# Patient Record
Sex: MALE | Race: WHITE | HISPANIC OR LATINO | Employment: FULL TIME | URBAN - METROPOLITAN AREA
[De-identification: names, ages, dates, MRNs, and addresses within clinical notes are randomized per-mention and may not be internally consistent; named-entity substitution may affect disease eponyms.]

---

## 2017-04-24 ENCOUNTER — OCCUPATIONAL MEDICINE (OUTPATIENT)
Dept: URGENT CARE | Facility: CLINIC | Age: 32
End: 2017-04-24
Payer: COMMERCIAL

## 2017-04-24 VITALS
SYSTOLIC BLOOD PRESSURE: 140 MMHG | HEART RATE: 98 BPM | RESPIRATION RATE: 18 BRPM | DIASTOLIC BLOOD PRESSURE: 100 MMHG | WEIGHT: 266 LBS | TEMPERATURE: 97.8 F | BODY MASS INDEX: 33.07 KG/M2 | HEIGHT: 75 IN | OXYGEN SATURATION: 97 %

## 2017-04-24 DIAGNOSIS — S39.012A STRAIN OF LUMBAR REGION, INITIAL ENCOUNTER: ICD-10-CM

## 2017-04-24 PROCEDURE — 99203 OFFICE O/P NEW LOW 30 MIN: CPT | Mod: 29 | Performed by: NURSE PRACTITIONER

## 2017-04-24 RX ORDER — OMEPRAZOLE 20 MG/1
20 CAPSULE, DELAYED RELEASE ORAL DAILY
COMMUNITY
End: 2021-08-23

## 2017-04-24 RX ORDER — IBUPROFEN 200 MG
200 TABLET ORAL EVERY 6 HOURS PRN
COMMUNITY
End: 2022-12-28

## 2017-04-24 ASSESSMENT — ENCOUNTER SYMPTOMS
TREMORS: 0
NEUROLOGICAL NEGATIVE: 1
NECK PAIN: 0
BACK PAIN: 1
GASTROINTESTINAL NEGATIVE: 1
FALLS: 0
CONSTITUTIONAL NEGATIVE: 1
EYES NEGATIVE: 1
PSYCHIATRIC NEGATIVE: 1
FOCAL WEAKNESS: 0
CARDIOVASCULAR NEGATIVE: 1
RESPIRATORY NEGATIVE: 1
MYALGIAS: 0
SENSORY CHANGE: 0
TINGLING: 0

## 2017-04-24 NOTE — MR AVS SNAPSHOT
"        Adolfo Rodas De La Rosa   2017 11:15 AM   Occupational Medicine   MRN: [id number]    Department:  [facility]   Dept Phone:  [phone]    Description:  Male : 1985   Provider:  GATITO Vela           Reason for Visit     Work-Related Injury WC Back Injury       Allergies as of 2017     No Known Allergies      You were diagnosed with     Strain of lumbar region, initial encounter   [[id number]]         Vital Signs     Blood Pressure Pulse Temperature Respirations Height Weight    140/100 mmHg 98 36.6 °C (97.8 °F) 18 1.905 m (6' 3\") 120.657 kg (266 lb)    Body Mass Index Oxygen Saturation Smoking Status             33.25 kg/m2 97% Never Smoker          Basic Information     Date Of Birth Sex Race Ethnicity Preferred Language    1985 Male White Non- English      Health Maintenance     Patient has no pending health maintenance at this time      Current Immunizations     No immunizations on file.      Below and/or attached are the medications your provider expects you to take. Review all of your home medications and newly ordered medications with your provider and/or pharmacist. Follow medication instructions as directed by your provider and/or pharmacist. Please keep your medication list with you and share with your provider. Update the information when medications are discontinued, doses are changed, or new medications (including over-the-counter products) are added; and carry medication information at all times in the event of emergency situations     Allergies:  No Known Allergies          Medications  Valid as of: 2017 - 12:06 PM    Generic Name Brand Name Tablet Size Instructions for use    Ibuprofen (Tab) MOTRIN 200 MG Take 200 mg by mouth every 6 hours as needed.        Omeprazole (CAPSULE DELAYED RELEASE) PRILOSEC 20 MG Take 20 mg by mouth every day.        .                 Medicines prescribed today were sent to:     [facility] [place] [place] [address]    [address] [place] [place] [address]    Phone: [phone] Fax: [phone]    Open 24 Hours?: No      Medication refill instructions:       If your prescription bottle indicates you have medication refills left, it is not necessary to call your provider’s office. Please contact your pharmacy and they will refill your medication.    If your prescription bottle indicates you do not have any refills left, you may request refills at any time through one of the following ways: The online [employer] system (except Urgent Care), by calling your provider’s office, or by asking your pharmacy to contact your provider’s office with a refill request. Medication refills are processed only during regular business hours and may not be available until the next business day. Your provider may request additional information or to have a follow-up visit with you prior to refilling your medication.   *Please Note: Medication refills are assigned a new Rx number when refilled electronically. Your pharmacy may indicate that no refills were authorized even though a new prescription for the same medication is available at the pharmacy. Please request the medicine by name with the pharmacy before contacting your provider for a refill.           [employer] Access Code: [email or web address]  Expires: 5/24/2017 12:06 PM    [employer]  A secure, online tool to manage your health information     [employer]’s [employer]® is a secure, online tool that connects you to your personalized health information from the privacy of your home -- day or night - making it very easy for you to manage your healthcare. Once the activation process is completed, you can even access your medical information using the [employer] gonzalo, which is available for free in the Apple Gonzalo store or Google Play store.     [employer] provides the following levels of access (as shown below):   My Chart Features   [facility] Primary [facility] Doctor [facility]  Specialists [facility]  Urgent  Care  Non-[facility]  Primary Care  Doctor   Email your healthcare team securely and privately 24/7 X X X    Manage appointments: schedule your next appointment; view details of past/upcoming appointments X      Request prescription refills. X      View recent personal medical records, including lab and immunizations X X X X   View health record, including health history, allergies, medications X X X X   Read reports about your outpatient visits, procedures, consult and ER notes X X X X   See your discharge summary, which is a recap of your hospital and/or ER visit that includes your diagnosis, lab results, and care plan. X X       How to register for [employer]:  1. Go to  [email or web address].  2. Click on the Sign Up Now box, which takes you to the New Member Sign Up page. You will need to provide the following information:  a. Enter your [employer] Access Code exactly as it appears at the top of this page. (You will not need to use this code after you’ve completed the sign-up process. If you do not sign up before the expiration date, you must request a new code.)   b. Enter your date of birth.   c. Enter your home email address.   d. Click Submit, and follow the next screen’s instructions.  3. Create a [employer] ID. This will be your [employer] login ID and cannot be changed, so think of one that is secure and easy to remember.  4. Create a [employer] password. You can change your password at any time.  5. Enter your Password Reset Question and Answer. This can be used at a later time if you forget your password.   6. Enter your e-mail address. This allows you to receive e-mail notifications when new information is available in Lang [employer].  7. Click Sign Up. You can now view your health information.    For assistance activating your [employer] account, call [phone]

## 2017-04-24 NOTE — Clinical Note
"EMPLOYEE’S CLAIM FOR COMPENSATION/ REPORT OF INITIAL TREATMENT  FORM C-4    EMPLOYEE’S CLAIM - PROVIDE ALL INFORMATION REQUESTED   First Name  Adolfo Last Name  Rj Birthdate                    1985                Sex  male Claim Number   Home Address  [id number] Age  31 y.o. Height  1.905 m (6' 3\") Weight  120.657 kg (266 lb) [facility]     [place] Zip  [address] Telephone  [phone] (home)    Mailing Address  [id number] [place] Zip  [address] Primary Language Spoken  English    Insurer   Third Party   Esis   Employee's Occupation (Job Title) When Injury or Occupational Disease Occurred      Employer's Name    [employer] Telephone   [phone]   [address]   [address] [place]   [address]   Date of Injury  4/23/2017               Hour of Injury  5:30 PM Date Employer Notified  4/24/2017 Last Day of Work after Injury or Occupational Disease  4/21/2017 Supervisor to Whom Injury Reported  candido moore   Address or Location of Accident (if applicable)  [home]   What were you doing at the time of accident? (if applicable)  moving pos sign bin    How did this injury or occupational disease occur? (Be specific an answer in detail. Use additional sheet if necessary)  moving pos sign bin/ organizing material in vehicle   If you believe that you have an occupational disease, when did you first have knowledge of the disability and it relationship to your employment?  n/a Witnesses to the Accident  n/a      Nature of Injury or Occupational Disease  Workers' Compensation  Part(s) of Body Injured or Affected  Lower Back Area (Lumbar Area & Lumbo-Sacral), N/A, N/A    I certify that the above is true and correct to the best of my knowledge and that I have provided this information in order to obtain " the benefits of [place]).  I hereby authorize any physician, chiropractor, surgeon, practitioner, or other person, any hospital, including [facility] or [facility] hospital, any medical service organization, any insurance company, or other institution or organization to release to each other, any medical or other information, including benefits paid or payable, pertinent to this injury or disease, except information relative to diagnosis, treatment and/or counseling for AIDS, psychological conditions, alcohol or controlled substances, for which I must give specific authorization.  A Photostat of this authorization shall be as valid as the original.     Date 4/24/2017   [facility] Urgent care   Employee’s Signature   THIS REPORT MUST BE COMPLETED AND MAILED WITHIN 3 WORKING DAYS OF TREATMENT   [facility] URGENT [facility]ARE  Name of Facility  [facility]   Date  4/24/2017 Diagnosis  (S39.012A) Strain of lumbar region, initial encounter Is there evidence the injured employee was under the influence of alcohol and/or another controlled substance at the time of accident?   Hour  11:21 AM Description of Injury or Disease  The encounter diagnosis was Strain of lumbar region, initial encounter. No   Treatment  OTC naproxen, rest, heat or icy therapy, work restrictions, return to clinic 4/27/17 for re-eval  Have you advised the patient to remain off work five days or more? No   X-Ray Findings    Comments:N/A   If Yes   From Date  To Date      From information given by the employee, together with medical evidence, can you directly connect this injury or occupational disease as job incurred?  Yes  Comments:Difficult to fully correlate at this time If No Full Duty  No Modified Duty  Yes   Is additional medical care by a physician indicated?  Yes If Modified Duty, Specify any Limitations /  "Restrictions  Per D39   Do you know of any previous injury or disease contributing to this condition or occupational disease?                            No   Date  4/24/2017 Print Doctor’s Name GATITO Vela I certify the employer’s copy of  this form was mailed on:   Address  [address] Insurer[address]s Use Only     [place] Zip  [address]    Provider’s Tax ID Number  [id number]  Telephone  Dept: [phone]        e-MATILDA Diop   e-Signature: Dr. Shun Melgar, Medical Director Degree  APRN        ORIGINAL-TREATING PHYSICIAN OR CHIROPRACTOR    PAGE 2-INSURER/TPA    PAGE 3-EMPLOYER    PAGE 4-EMPLOYEE             Form C-4 (rev10/07)              BRIEF DESCRIPTION OF RIGHTS AND BENEFITS  (Pursuant to [place] 616C.050)    Notice of Injury or Occupational Disease (Incident Report Form C-1): If an injury or occupational disease (OD) arises out of and in the  course of employment, you must provide written notice to your employer as soon as practicable, but no later than 7 days after the accident or  OD. Your employer shall maintain a sufficient supply of the required forms.    Claim for Compensation (Form C-4): If medical treatment is sought, the form C-4 is available at the place of initial treatment. A completed  \"Claim for Compensation\" (Form C-4) must be filed within 90 days after an accident or OD. The treating physician or chiropractor must,  within 3 working days after treatment, complete and mail to the employer, the employer's insurer and third-party , the Claim for  Compensation.    Medical Treatment: If you require medical treatment for your on-the-job injury or OD, you may be required to select a physician or  chiropractor from a list provided by your workers’ compensation insurer, if it has contracted with an Organization for Managed Care (MCO) or  Preferred Provider Organization (PPO) or providers of health care. If your employer has not entered into a " contract with an MCO or PPO, you  may select a physician or chiropractor from the Panel of Physicians and Chiropractors. Any medical costs related to your industrial injury or  OD will be paid by your insurer.    Temporary Total Disability (TTD): If your doctor has certified that you are unable to work for a period of at least 5 consecutive days, or 5  cumulative days in a 20-day period, or places restrictions on you that your employer does not accommodate, you may be entitled to TTD  compensation.    Temporary Partial Disability (TPD): If the wage you receive upon reemployment is less than the compensation for TTD to which you are  entitled, the insurer may be required to pay you TPD compensation to make up the difference. TPD can only be paid for a maximum of 24  months.    Permanent Partial Disability (PPD): When your medical condition is stable and there is an indication of a PPD as a result of your injury or  OD, within 30 days, your insurer must arrange for an evaluation by a rating physician or chiropractor to determine the degree of your PPD. The  amount of your PPD award depends on the date of injury, the results of the PPD evaluation and your age and wage.    Permanent Total Disability (PTD): If you are medically certified by a treating physician or chiropractor as permanently and totally disabled  and have been granted a PTD status by your insurer, you are entitled to receive monthly benefits not to exceed 66 2/3% of your average  monthly wage. The amount of your PTD payments is subject to reduction if you previously received a PPD award.    Vocational Rehabilitation Services: You may be eligible for vocational rehabilitation services if you are unable to return to the job due to a  permanent physical impairment or permanent restrictions as a result of your injury or occupational disease.    Transportation and Per Sean Reimbursement: You may be eligible for travel expenses and per sean associated with  medical treatment.    Reopening: You may be able to reopen your claim if your condition worsens after claim closure.    Appeal Process: If you disagree with a written determination issued by the insurer or the insurer does not respond to your request, you may  appeal to the Department of Administration, , by following the instructions contained in your determination letter. You must  appeal the determination within 70 days from the date of the determination letter at [address], [address], [place], [place]  [address], or [address], [place], [place] [address]. If you disagree with the  decision, you may appeal to the  Department of Administration, . You must file your appeal within 30 days from the date of the  decision  letter at [address], [address], [place], [place] [address], or [address], [place], [place] [address]. If you  disagree with a decision of an , you may file a petition for judicial review with the District Court. You must do so within 30  days of the Appeal Officer’s decision. You may be represented by an  at your own expense or you may contact the [facility] for possible  representation.    [place]  for Injured Workers (NAIW): If you disagree with a  decision, you may request that NAIW represent you  without charge at an  Hearing. For information regarding denial of benefits, you may contact the [facility] at: [address], [place] [address], [phone], or [address], [address], [place] [address], [phone]    To File a Complaint with the Division: If you wish to file a complaint with the  of the Division of Industrial Relations (DIR),  please contact the Workers’ Compensation Section, [address], [address], [place], [place] [address], telephone [phone],  or  [address], [address], [place], [place] [address], telephone [phone].    For assistance with Workers’ Compensation Issues: you may contact the Office of the Governor Consumer Health Assistance, [address] [place] [address], [address], [place], [place] [address], Toll Free 1-610.398.5661, Web site: [email or web address]., E-mail  [email or web address].                                                                                                                                                                                                                                   __________________________________________________________________                                                                   _________________                Employee Name / Signature                                                                                                                                                       Date                                                                                                                                                                                                     D-2 (rev. 10/07)

## 2017-04-24 NOTE — PROGRESS NOTES
"Subjective:      Adolfo De La Rosa is a 31 y.o. male who presents with Work-Related Injury    HPI  DOI 4/23/2017: Pt was lifting a heavy bin, weighing approximately 100lbs, after lifting he turned to the right and immediately felt pain and a pinch to his entire low back. He then developed spasm sensations to the area shortly after. Pain is currently rated 5/10 at rest and increases up to 9/10 with certain movements and walking. He used tiger balm, ice and took ibuprofen for symptom relief with minimal improvement. Denies saddle anesthesia, numbness or tingling, unilateral weakness, or loss of bowel or bladder. Denies previous back pain or injuries. He denies other jobs or contributing factors.     Past Medical History   Diagnosis Date   • GERD (gastroesophageal reflux disease)      Past Surgical History   Procedure Laterality Date   • Hernia repair       No current outpatient prescriptions on file prior to visit.     No current facility-administered medications on file prior to visit.     Review of patient's allergies indicates no known allergies.    Review of Systems   Constitutional: Negative.    HENT: Negative.    Eyes: Negative.    Respiratory: Negative.    Cardiovascular: Negative.    Gastrointestinal: Negative.    Genitourinary: Negative.    Musculoskeletal: Positive for back pain. Negative for myalgias, joint pain, falls and neck pain.   Skin: Negative.    Neurological: Negative.  Negative for tingling, tremors, sensory change and focal weakness.   Endo/Heme/Allergies: Negative.    Psychiatric/Behavioral: Negative.           Objective:     /100 mmHg  Pulse 98  Temp(Src) 36.6 °C (97.8 °F)  Resp 18  Ht 1.905 m (6' 3\")  Wt 120.657 kg (266 lb)  BMI 33.25 kg/m2  SpO2 97%     Physical Exam   Constitutional: He is oriented to person, place, and time. Vital signs are normal. He appears well-developed and well-nourished. He does not appear ill. No distress.   HENT:   Head: Normocephalic and atraumatic. "   Right Ear: External ear normal.   Left Ear: External ear normal.   Nose: Nose normal.   Mouth/Throat: Oropharynx is clear and moist.   Eyes: Conjunctivae are normal. Pupils are equal, round, and reactive to light. Right eye exhibits no discharge. Left eye exhibits no discharge. No scleral icterus.   Neck: Normal range of motion. Neck supple.   Cardiovascular: Normal rate, regular rhythm, normal heart sounds and intact distal pulses.    Pulmonary/Chest: Effort normal and breath sounds normal. No respiratory distress. He exhibits no tenderness.   Musculoskeletal: He exhibits tenderness. He exhibits no edema.        Cervical back: Normal.        Thoracic back: Normal.        Lumbar back: He exhibits decreased range of motion, tenderness, pain and spasm. He exhibits no bony tenderness.   No spinal tenderness. There is pain and associated spasms to bilateral lumbosacral region. Patellar reflexes +2. N/V intact. ROM limited in forward flexion. Gait stable.    Lymphadenopathy:     He has cervical adenopathy.   Neurological: He is alert and oriented to person, place, and time. He has normal strength and normal reflexes. He displays normal reflexes. No cranial nerve deficit or sensory deficit. He exhibits normal muscle tone. Coordination and gait normal.   Skin: Skin is warm, dry and intact. No rash noted. He is not diaphoretic. No erythema. No pallor.   Psychiatric: He has a normal mood and affect. His behavior is normal. Judgment and thought content normal.   Vitals reviewed.           Assessment/Plan:     1. Strain of lumbar region, initial encounter        OTC naproxen, rest, heat or ice therapy, work restrictions, return to clinic 4/27/17 for re-eval  Supportive care, differential diagnoses, and indications for immediate follow-up discussed with patient.   Pathogenesis of diagnosis discussed including typical length and natural progression.   Instructed to return to clinic or nearest emergency department sooner for  any change in condition, further concerns, or worsening of symptoms.  Patient states understanding of the plan of care and discharge instructions.      SHERRY Vela.

## 2017-04-24 NOTE — Clinical Note
[facility]   [address] [place] [address]  Phone: [phone] - Fax: [phone]        [facility] Progress Report and Disability Certification  Date of Service: 4/24/2017   No Show:  No  Date / Time of Next Visit: 4/27/2017 5:00 PM   Claim Information   Patient Name: Adolfo De La Rosa  Claim Number:     Employer:  [employer] Date of Injury: 4/23/2017     Insurer / TPA: Vic  ID / SSN:     Occupation:   Diagnosis: The encounter diagnosis was Strain of lumbar region, initial encounter.    Medical Information   Related to Industrial Injury? Yes  Comments:Difficult to fully correlate at this time    Subjective Complaints:  DOI 4/23/2017: Pt was lifting a heavy bin, weighing approximately 100lbs, after lifting he turned to the right and immediately felt pain and a pinch to his entire low back. He then developed spasm sensations to the area shortly after. Pain is currently rated 5/10 at rest and increases up to 9/10 with certain movements and walking. He used tiger balm, ice and took ibuprofen for symptom relief with minimal improvement. Denies saddle anesthesia, numbness or tingling, unilateral weakness, or loss of bowel or bladder. Denies previous back pain or injuries. He denies other jobs or contributing factors.    Objective Findings: A/Ox4. NAD. Lungs CTA. No spinal tenderness. There is pain and associated spasms to bilateral lumbosacral region. Patellar reflexes +2. N/V intact. ROM limited in forward flexion. Gait stable.    Pre-Existing Condition(s): Denies    Assessment:   Initial Visit    Status: Additional Care Required  Permanent Disability:No    Plan: Medication  Comments:OTC naproxen, rest, heat or icy therapy, work restrictions, return to clinic 4/27/17 for re-eval     Diagnostics:   Comments:N/A    Comments:       Disability Information   Status: Released to Restricted Duty    From:   2017  Through: 2017 Restrictions are: Temporary   Physical Restrictions   Sitting:  < or = to 6 hrs/day Standing:  < or = to 6 hrs/day Stooping:    Bendin hrs/day   Squatting:    Walking:  < or = to 2 hrs/day Climbing:    Pushin hrs/day   Pullin hrs/day Other:    Reaching Above Shoulder (L):   Reaching Above Shoulder (R):       Reaching Below Shoulder (L):    Reaching Below Shoulder (R):      Not to exceed Weight Limits   Carrying(hrs):   Weight Limit(lb): < or = to 10 pounds Lifting(hrs):   Weight  Limit(lb): < or = to 10 pounds   Comments:      Repetitive Actions   Hands: i.e. Fine Manipulations from Grasping:     Feet: i.e. Operating Foot Controls:     Driving / Operate Machinery:     Physician Name: GATITO Vela Physician Signature: MATILDA Cano e-Signature: Dr. Shun Melgar, Medical Director   Clinic Name / Location: [facility]  [address] [place] [address] Clinic Phone Number: Dept: [phone]   Appointment Time: 11:15 Am Visit Start Time: 11:21 AM   Check-In Time:  11:12 Am Visit Discharge Time:  11:58 AM   Original-Treating Physician or Chiropractor    Page 2-Insurer/TPA    Page 3-Employer    Page 4-Employee

## 2017-04-27 ENCOUNTER — OCCUPATIONAL MEDICINE (OUTPATIENT)
Dept: URGENT CARE | Facility: CLINIC | Age: 32
End: 2017-04-27
Payer: COMMERCIAL

## 2017-04-27 VITALS
HEIGHT: 75 IN | SYSTOLIC BLOOD PRESSURE: 128 MMHG | BODY MASS INDEX: 33.07 KG/M2 | WEIGHT: 266 LBS | DIASTOLIC BLOOD PRESSURE: 90 MMHG | HEART RATE: 114 BPM | OXYGEN SATURATION: 97 % | RESPIRATION RATE: 18 BRPM | TEMPERATURE: 97.7 F

## 2017-04-27 DIAGNOSIS — S39.012D BACK STRAIN, SUBSEQUENT ENCOUNTER: ICD-10-CM
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
## 2017-04-27 PROCEDURE — 99213 OFFICE O/P EST LOW 20 MIN: CPT | Mod: 29 | Performed by: PHYSICIAN ASSISTANT

## 2017-04-27 RX ORDER — HYDROCODONE BITARTRATE AND ACETAMINOPHEN 5; 325 MG/1; MG/1
1-2 TABLET ORAL EVERY 6 HOURS PRN
Qty: 16 TAB | Refills: 0 | Status: SHIPPED | OUTPATIENT
Start: 2017-04-27 | End: 2021-05-21

## 2017-04-27 RX ORDER — CYCLOBENZAPRINE HCL 10 MG
10 TABLET ORAL 3 TIMES DAILY PRN
Qty: 15 TAB | Refills: 0 | Status: SHIPPED | OUTPATIENT
Start: 2017-04-27 | End: 2021-05-21

## 2017-04-27 RX ORDER — PREDNISONE 20 MG/1
40 TABLET ORAL DAILY
Qty: 8 TAB | Refills: 0 | Status: SHIPPED | OUTPATIENT
Start: 2017-04-27 | End: 2017-05-01

## 2017-04-27 ASSESSMENT — ENCOUNTER SYMPTOMS
BOWEL INCONTINENCE: 0
PARESIS: 0
TINGLING: 0
WEAKNESS: 1
FOCAL WEAKNESS: 1
FEVER: 0
GASTROINTESTINAL NEGATIVE: 1
PERIANAL NUMBNESS: 0
PARESTHESIAS: 0
SENSORY CHANGE: 0
BACK PAIN: 1
LEG PAIN: 0
NUMBNESS: 0

## 2017-04-27 NOTE — MR AVS SNAPSHOT
"        Adolfo De La Rosa   2017 10:00 AM   Occupational Medicine   MRN: [id number]    Department:  [facility]   Dept Phone:  [phone]    Description:  Male : 1985   Provider:  Hugh Jose PA-C           Reason for Visit     Work-Related Injury WC F/V       Allergies as of 2017     No Known Allergies      You were diagnosed with     Back strain, subsequent encounter   [882797]       Work related injury   [[id number]]         Vital Signs     Blood Pressure Pulse Temperature Respirations Height Weight    128/90 mmHg 114 36.5 °C (97.7 °F) 18 1.905 m (6' 3\") 120.657 kg (266 lb)    Body Mass Index Oxygen Saturation Smoking Status             33.25 kg/m2 97% Never Smoker          Basic Information     Date Of Birth Sex Race Ethnicity Preferred Language    1985 Male White Non- English      Your appointments     May 03, 2017 11:00 AM   Workers Compensation with [facility] [place]    [address] [id number] [phone]              Health Maintenance        Date Due Completion Dates    IMM DTaP/Tdap/Td Vaccine (1 - Tdap) 2002 ---            Current Immunizations     No immunizations on file.      Below and/or attached are the medications your provider expects you to take. Review all of your home medications and newly ordered medications with your provider and/or pharmacist. Follow medication instructions as directed by your provider and/or pharmacist. Please keep your medication list with you and share with your provider. Update the information when medications are discontinued, doses are changed, or new medications (including over-the-counter products) are added; and carry medication information at all times in the event of emergency situations     Allergies:  No Known Allergies          Medications  Valid as of: 2017 - 11:21 AM    Generic Name Brand Name Tablet Size Instructions for use   " Cyclobenzaprine HCl (Tab) FLEXERIL 10 MG Take 1 Tab by mouth 3 times a day as needed for Mild Pain or Muscle Spasms ((or use qhs) (medicine will cause drowsiness)).        Hydrocodone-Acetaminophen (Tab) NORCO 5-325 MG Take 1-2 Tabs by mouth every 6 hours as needed.        Ibuprofen (Tab) MOTRIN 200 MG Take 200 mg by mouth every 6 hours as needed.        Omeprazole (CAPSULE DELAYED RELEASE) PRILOSEC 20 MG Take 20 mg by mouth every day.        PredniSONE (Tab) DELTASONE 20 MG Take 2 Tabs by mouth every day for 4 days.        .                 Medicines prescribed today were sent to:     [facility] - [place], [place] - [address]    [address] [place] [address]    Phone: [phone] Fax: [phone]    Open 24 Hours?: No      Medication refill instructions:       If your prescription bottle indicates you have medication refills left, it is not necessary to call your provider’s office. Please contact your pharmacy and they will refill your medication.    If your prescription bottle indicates you do not have any refills left, you may request refills at any time through one of the following ways: The online [employer] system (except Urgent Care), by calling your provider’s office, or by asking your pharmacy to contact your provider’s office with a refill request. Medication refills are processed only during regular business hours and may not be available until the next business day. Your provider may request additional information or to have a follow-up visit with you prior to refilling your medication.   *Please Note: Medication refills are assigned a new Rx number when refilled electronically. Your pharmacy may indicate that no refills were authorized even though a new prescription for the same medication is available at the pharmacy. Please request the medicine by name with the pharmacy before contacting your provider for a refill.           [employer] Access Code: [email or web address]  Expires: 5/24/2017 12:06  PM    STAR FESTIVALhart  A secure, online tool to manage your health information     [employer]’s Alacritech® is a secure, online tool that connects you to your personalized health information from the privacy of your home -- day or night - making it very easy for you to manage your healthcare. Once the activation process is completed, you can even access your medical information using the Alacritech gonzalo, which is available for free in the Apple Gonzalo store or Google Play store.     Alacritech provides the following levels of access (as shown below):   My Chart Features   [facility] Primary [facility] Doctor [facility]  Specialists [facility]  Urgent  Care Non-[facility]  Primary Care  Doctor   Email your healthcare team securely and privately 24/7 X X X    Manage appointments: schedule your next appointment; view details of past/upcoming appointments X      Request prescription refills. X      View recent personal medical records, including lab and immunizations X X X X   View health record, including health history, allergies, medications X X X X   Read reports about your outpatient visits, procedures, consult and ER notes X X X X   See your discharge summary, which is a recap of your hospital and/or ER visit that includes your diagnosis, lab results, and care plan. X X       How to register for Alacritech:  1. Go to  [email or web address].  2. Click on the Sign Up Now box, which takes you to the New Member Sign Up page. You will need to provide the following information:  a. Enter your Alacritech Access Code exactly as it appears at the top of this page. (You will not need to use this code after you’ve completed the sign-up process. If you do not sign up before the expiration date, you must request a new code.)   b. Enter your date of birth.   c. Enter your home email address.   d. Click Submit, and follow the next screen’s instructions.  3. Create a Alacritech ID. This will be your Alacritech login ID and cannot be changed, so think of one that is secure and  easy to remember.  4. Create a Cylande password. You can change your password at any time.  5. Enter your Password Reset Question and Answer. This can be used at a later time if you forget your password.   6. Enter your e-mail address. This allows you to receive e-mail notifications when new information is available in Cylande.  7. Click Sign Up. You can now view your health information.    For assistance activating your Cylande account, call [phone]

## 2017-04-27 NOTE — Clinical Note
[facility]   [address] [place] [address]  Phone: [phone] - Fax: [phone]        [facility] Progress Report and Disability Certification  Date of Service: 2017   No Show:  No  Date / Time of Next Visit: 5/3/2017   Claim Information   Patient Name: Adolfo De La Rosa  Claim Number:     Employer:   [employer] Date of Injury: 2017     Insurer / TPA: [employer]  ID / SSN:     Occupation:   Diagnosis: Diagnoses of Back strain, subsequent encounter and Work related injury were pertinent to this visit.    Medical Information   Related to Industrial Injury? Yes    Subjective Complaints:  lbp   Objective Findings: Tend lumbar; limited rom   Pre-Existing Condition(s): none   Assessment:   Condition Improved    Status: Additional Care Required  Permanent Disability:No    Plan: Medication  Comments:rx meds    Diagnostics:   Comments:na    Comments:       Disability Information   Status: Released to Restricted Duty    From:  2017  Through: 5/3/2017 Restrictions are: Temporary   Physical Restrictions   Sitting:    Standing:    Stooping:    Bendin hrs/day   Squatting:    Walking:    Climbing:    Pushin hrs/day   Pullin hrs/day Other:    Reaching Above Shoulder (L):   Reaching Above Shoulder (R):       Reaching Below Shoulder (L):  0 hrs/day Reaching Below Shoulder (R):  0 hrs/day   Not to exceed Weight Limits   Carrying(hrs): 2 Weight Limit(lb): < or = to 10 pounds Lifting(hrs): 2 Weight  Limit(lb): < or = to 10 pounds   Comments: Back pain improving; in general, limited lifting /bending restrictions; follow up eval 1 wk    Repetitive Actions   Hands: i.e. Fine Manipulations from Grasping:     Feet: i.e. Operating Foot Controls:     Driving / Operate Machinery:     Physician Name: Tevin Jose PA-C Physician Signature: TEVIN Kenny PA-C e-Signature: Dr. Shun Melgar, Medical Director   Clinic Name /  Location: [facility]  [address] [place] [address]  [place] [address] Clinic Phone Number: Dept: [phone]   Appointment Time: 10:00 Am Visit Start Time: 10:11 AM   Check-In Time:  9:58 Am Visit Discharge Time: 10:37 AM   Original-Treating Physician or Chiropractor    Page 2-Insurer/TPA    Page 3-Employer    Page 4-Employee

## 2017-04-27 NOTE — PROGRESS NOTES
"Subjective:      Adolfo De La Rosa is a 31 y.o. male who presents with Work-Related Injury            Back Pain  This is a new problem. The current episode started in the past 7 days (3d f/u lbp/strain at work; improved but still painful). The problem occurs constantly. The problem has been gradually improving since onset. The pain is present in the lumbar spine. The quality of the pain is described as aching. The pain does not radiate. The pain is moderate. The pain is the same all the time. The symptoms are aggravated by bending and position. Stiffness is present all day. Associated symptoms include weakness. Pertinent negatives include no bladder incontinence, bowel incontinence, chest pain, dysuria, fever, leg pain, numbness, paresis, paresthesias, pelvic pain, perianal numbness or tingling. He has tried NSAIDs for the symptoms. The treatment provided mild relief.       Review of Systems   Constitutional: Negative for fever.   Cardiovascular: Negative for chest pain.   Gastrointestinal: Negative.  Negative for bowel incontinence.   Genitourinary: Negative.  Negative for bladder incontinence, dysuria and pelvic pain.   Musculoskeletal: Positive for back pain.   Skin: Negative.    Neurological: Positive for focal weakness and weakness. Negative for tingling, sensory change, numbness and paresthesias.          Objective:     /90 mmHg  Pulse 114  Temp(Src) 36.5 °C (97.7 °F)  Resp 18  Ht 1.905 m (6' 3\")  Wt 120.657 kg (266 lb)  BMI 33.25 kg/m2  SpO2 97%     Physical Exam   Constitutional: He is oriented to person, place, and time. He appears well-developed and well-nourished. No distress.   Musculoskeletal: He exhibits tenderness (gen tend across lumbar; incrs pn w/flex/rot mx; dist.[place] int). He exhibits no edema.        Lumbar back: He exhibits decreased range of motion, tenderness and pain. He exhibits no bony tenderness and no spasm.   Neurological: He is alert and oriented to person, place, and time. " "No sensory deficit. He exhibits abnormal muscle tone. Gait abnormal. Coordination normal.   Skin: Skin is warm and dry.   Psychiatric: He has a normal mood and affect. His behavior is normal. Judgment and thought content normal.   Nursing note and vitals reviewed.    Filed Vitals:    04/27/17 1011   BP: 128/90   Pulse: 114   Temp: 36.5 °C (97.7 °F)   Resp: 18   Height: 1.905 m (6' 3\")   Weight: 120.657 kg (266 lb)   SpO2: 97%     Active Ambulatory Problems     Diagnosis Date Noted   • No Active Ambulatory Problems     Resolved Ambulatory Problems     Diagnosis Date Noted   • No Resolved Ambulatory Problems     Past Medical History   Diagnosis Date   • GERD (gastroesophageal reflux disease)      Current Outpatient Prescriptions on File Prior to Visit   Medication Sig Dispense Refill   • ibuprofen (MOTRIN) 200 MG Tab Take 200 mg by mouth every 6 hours as needed.     • omeprazole (PRILOSEC) 20 MG delayed-release capsule Take 20 mg by mouth every day.       No current facility-administered medications on file prior to visit.     Gargles, Cepacol lozenges, Aleve/Advil as needed for throat pain  History reviewed. No pertinent family history.  Review of patient's allergies indicates no known allergies.              Assessment/Plan:     ·  lbp/strain at work; improving      · meds as rx; f/u 1 wk re eval      "

## 2017-05-03 ENCOUNTER — OCCUPATIONAL MEDICINE (OUTPATIENT)
Dept: URGENT CARE | Facility: CLINIC | Age: 32
End: 2017-05-03
Payer: COMMERCIAL

## 2017-05-03 VITALS
TEMPERATURE: 97.9 F | OXYGEN SATURATION: 97 % | BODY MASS INDEX: 33.07 KG/M2 | SYSTOLIC BLOOD PRESSURE: 138 MMHG | DIASTOLIC BLOOD PRESSURE: 88 MMHG | HEIGHT: 75 IN | HEART RATE: 101 BPM | WEIGHT: 266 LBS
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
## 2017-05-03 DIAGNOSIS — Z09 RESOLVED CONDITION, FOLLOW-UP: ICD-10-CM

## 2017-05-03 DIAGNOSIS — S39.012D BACK STRAIN, SUBSEQUENT ENCOUNTER: ICD-10-CM

## 2017-05-03 PROCEDURE — 99212 OFFICE O/P EST SF 10 MIN: CPT | Performed by: PHYSICIAN ASSISTANT

## 2017-05-03 ASSESSMENT — ENCOUNTER SYMPTOMS
NEUROLOGICAL NEGATIVE: 1
NUMBNESS: 0
WEAKNESS: 0
JOINT SWELLING: 0
MUSCULOSKELETAL NEGATIVE: 1
GASTROINTESTINAL NEGATIVE: 1
CONSTITUTIONAL NEGATIVE: 1

## 2017-05-03 NOTE — PROGRESS NOTES
"Subjective:      Adolfo De La Rosa is a 31 y.o. male who presents with Other            Other  This is a new problem. The current episode started in the past 7 days (f/u lbp/strain at work; improved, no acute pn w/mx). The problem occurs rarely. The problem has been resolved. Pertinent negatives include no joint swelling, numbness or weakness. Nothing aggravates the symptoms. He has tried rest for the symptoms. The treatment provided significant relief.       Review of Systems   Constitutional: Negative.    Gastrointestinal: Negative.    Genitourinary: Negative.    Musculoskeletal: Negative.  Negative for joint swelling.   Skin: Negative.    Neurological: Negative.  Negative for weakness and numbness.          Objective:     /88 mmHg  Pulse 101  Temp(Src) 36.6 °C (97.9 °F)  Ht 1.905 m (6' 3\")  Wt 120.657 kg (266 lb)  BMI 33.25 kg/m2  SpO2 97%     Physical Exam   Constitutional: He is oriented to person, place, and time. He appears well-developed and well-nourished. No distress.   Musculoskeletal: Normal range of motion. He exhibits no edema or tenderness.        Lumbar back: He exhibits normal range of motion, no tenderness, no bony tenderness, no pain and no spasm.   Neurological: He is alert and oriented to person, place, and time. No sensory deficit. He exhibits normal muscle tone. Coordination and gait normal.   Skin: Skin is warm and dry.   Psychiatric: He has a normal mood and affect. His behavior is normal. Judgment and thought content normal.   Nursing note and vitals reviewed.    Filed Vitals:    05/03/17 1106   BP: 138/88   Pulse: 101   Temp: 36.6 °C (97.9 °F)   Height: 1.905 m (6' 3\")   Weight: 120.657 kg (266 lb)   SpO2: 97%     Active Ambulatory Problems     Diagnosis Date Noted   • No Active Ambulatory Problems     Resolved Ambulatory Problems     Diagnosis Date Noted   • No Resolved Ambulatory Problems     Past Medical History   Diagnosis Date   • GERD (gastroesophageal reflux disease)  "     Current Outpatient Prescriptions on File Prior to Visit   Medication Sig Dispense Refill   • omeprazole (PRILOSEC) 20 MG delayed-release capsule Take 20 mg by mouth every day.     • hydrocodone-acetaminophen (NORCO) 5-325 MG Tab per tablet Take 1-2 Tabs by mouth every 6 hours as needed. 16 Tab 0   • cyclobenzaprine (FLEXERIL) 10 MG Tab Take 1 Tab by mouth 3 times a day as needed for Mild Pain or Muscle Spasms ((or use qhs) (medicine will cause drowsiness)). 15 Tab 0   • ibuprofen (MOTRIN) 200 MG Tab Take 200 mg by mouth every 6 hours as needed.       No current facility-administered medications on file prior to visit.     Gargles, Cepacol lozenges, Aleve/Advil as needed for throat pain  History reviewed. No pertinent family history.  Review of patient's allergies indicates no known allergies.            Assessment/Plan:     ·  lbp/stain at work, resolved      · Condition resolved; will close w/c case w/o disability

## 2017-05-03 NOTE — MR AVS SNAPSHOT
"        Adolfo Carrenoos   5/3/2017 11:00 AM   Occupational Medicine   MRN: [id number]    Department:  [facility]   Dept Phone:  [phone]    Description:  Male : 1985   Provider:  Hugh Jose PA-C           Reason for Visit     Other Back injury       Allergies as of 5/3/2017     No Known Allergies      You were diagnosed with     Back strain, subsequent encounter   [828819]       Work related injury   [621255]       Resolved condition, follow-up   [041634]         Vital Signs     Blood Pressure Pulse Temperature Height Weight Body Mass Index    138/88 mmHg 101 36.6 °C (97.9 °F) 1.905 m (6' 3\") 120.657 kg (266 lb) 33.25 kg/m2    Oxygen Saturation Smoking Status                97% Never Smoker           Basic Information     Date Of Birth Sex Race Ethnicity Preferred Language    1985 Male White Non- English      Health Maintenance        Date Due Completion Dates    IMM DTaP/Tdap/Td Vaccine (1 - Tdap) 2002 ---            Current Immunizations     No immunizations on file.      Below and/or attached are the medications your provider expects you to take. Review all of your home medications and newly ordered medications with your provider and/or pharmacist. Follow medication instructions as directed by your provider and/or pharmacist. Please keep your medication list with you and share with your provider. Update the information when medications are discontinued, doses are changed, or new medications (including over-the-counter products) are added; and carry medication information at all times in the event of emergency situations     Allergies:  No Known Allergies          Medications  Valid as of: May 03, 2017 - 11:47 AM    Generic Name Brand Name Tablet Size Instructions for use    Cyclobenzaprine HCl (Tab) FLEXERIL 10 MG Take 1 Tab by mouth 3 times a day as needed for Mild Pain or Muscle Spasms ((or use qhs) (medicine will cause drowsiness)).        Hydrocodone-Acetaminophen " (Tab) NORCO 5-325 MG Take 1-2 Tabs by mouth every 6 hours as needed.        Ibuprofen (Tab) MOTRIN 200 MG Take 200 mg by mouth every 6 hours as needed.        Omeprazole (CAPSULE DELAYED RELEASE) PRILOSEC 20 MG Take 20 mg by mouth every day.        .                 Medicines prescribed today were sent to:     [facility] [place], [place] - [address]    [address] [place] [address]    Phone: [phone] Fax: [phone]    Open 24 Hours?: No      Medication refill instructions:       If your prescription bottle indicates you have medication refills left, it is not necessary to call your provider’s office. Please contact your pharmacy and they will refill your medication.    If your prescription bottle indicates you do not have any refills left, you may request refills at any time through one of the following ways: The online [employer] system (except Urgent Care), by calling your provider’s office, or by asking your pharmacy to contact your provider’s office with a refill request. Medication refills are processed only during regular business hours and may not be available until the next business day. Your provider may request additional information or to have a follow-up visit with you prior to refilling your medication.   *Please Note: Medication refills are assigned a new Rx number when refilled electronically. Your pharmacy may indicate that no refills were authorized even though a new prescription for the same medication is available at the pharmacy. Please request the medicine by name with the pharmacy before contacting your provider for a refill.           [employer] Access Code: [email or web address]  Expires: 5/24/2017 12:06 PM    [employer]  A secure, online tool to manage your health information     [employer]® is a secure, online tool that connects you to your personalized health information from the privacy of your home -- day or night - making it very easy for you to manage your healthcare. Once the  activation process is completed, you can even access your medical information using the Say-Hey gonzalo, which is available for free in the [employer] Gonzalo store or Google Play store.     Say-Hey provides the following levels of access (as shown below):   My Chart Features   [facility] Doctor [facility]  Specialists [facility]  Urgent  Care Non-[facility]  Primary Care  Doctor   Email your healthcare team securely and privately 24/7 X X X    Manage appointments: schedule your next appointment; view details of past/upcoming appointments X      Request prescription refills. X      View recent personal medical records, including lab and immunizations X X X X   View health record, including health history, allergies, medications X X X X   Read reports about your outpatient visits, procedures, consult and ER notes X X X X   See your discharge summary, which is a recap of your hospital and/or ER visit that includes your diagnosis, lab results, and care plan. X X       How to register for Say-Hey:  1. Go to  [email or web address].  2. Click on the Sign Up Now box, which takes you to the New Member Sign Up page. You will need to provide the following information:  a. Enter your Say-Hey Access Code exactly as it appears at the top of this page. (You will not need to use this code after you’ve completed the sign-up process. If you do not sign up before the expiration date, you must request a new code.)   b. Enter your date of birth.   c. Enter your home email address.   d. Click Submit, and follow the next screen’s instructions.  3. Create a Say-Hey ID. This will be your Say-Hey login ID and cannot be changed, so think of one that is secure and easy to remember.  4. Create a Say-Hey password. You can change your password at any time.  5. Enter your Password Reset Question and Answer. This can be used at a later time if you forget your password.   6. Enter your e-mail address. This allows you to receive e-mail notifications when new  information is available in [employer].  7. Click Sign Up. You can now view your health information.    For assistance activating your [employer] account, call [phone]

## 2017-05-03 NOTE — Clinical Note
[facility]   [address] [place] [address]  Phone: [phone] - Fax: [phone]        [facility] Progress Report and Disability Certification  Date of Service: 5/3/2017   No Show:  No  Date / Time of Next Visit:  work comp case closed   Claim Information   Patient Name: Adolfo De La Rosa  Claim Number:     Employer:  [employer] Date of Injury: 4/23/2017     Insurer / TPA: [employer]  ID / SSN:     Occupation:   Diagnosis: Diagnoses of Back strain, subsequent encounter, Work related injury, and Resolved condition, follow-up were pertinent to this visit.    Medical Information   Related to Industrial Injury? Yes    Subjective Complaints:  No back pn   Objective Findings: No back pn; has nl mx   Pre-Existing Condition(s): none   Assessment:   Condition Improved    Status: Discharged /  MMI  Permanent Disability:No    Plan:   Comments:na    Diagnostics:   Comments:na    Comments:       Disability Information   Status: Released to Full Duty    From:  5/3/2017  Through:   Restrictions are:     Physical Restrictions   Sitting:    Standing:    Stooping:    Bending:      Squatting:    Walking:    Climbing:    Pushing:      Pulling:    Other:    Reaching Above Shoulder (L):   Reaching Above Shoulder (R):       Reaching Below Shoulder (L):    Reaching Below Shoulder (R):      Not to exceed Weight Limits   Carrying(hrs):   Weight Limit(lb):   Lifting(hrs):   Weight  Limit(lb):     Comments: Back strain condition resolved; work comp case close without disability    Repetitive Actions   Hands: i.e. Fine Manipulations from Grasping:     Feet: i.e. Operating Foot Controls:     Driving / Operate Machinery:     Physician Name: Tevin Jose PA-C Physician Signature: TEVIN Kenny PA-C e-Signature: Dr. Shun Melgar, Medical Director   Clinic Name / Location: [facility]  [address] [place] [address] Clinic Phone  Number: Dept: [phone]   Appointment Time: 11:00 Am Visit Start Time: 11:06 AM   Check-In Time:  11:00 Am Visit Discharge Time: 11:23 AM   Original-Treating Physician or Chiropractor    Page 2-Insurer/TPA    Page 3-Employer    Page 4-Employee

## 2017-07-28 ENCOUNTER — OFFICE VISIT (OUTPATIENT)
Dept: URGENT CARE | Facility: CLINIC | Age: 32
End: 2017-07-28
Payer: COMMERCIAL

## 2017-07-28 ENCOUNTER — APPOINTMENT (OUTPATIENT)
Dept: RADIOLOGY | Facility: IMAGING CENTER | Age: 32
End: 2017-07-28
Attending: NURSE PRACTITIONER
Payer: COMMERCIAL

## 2017-07-28 VITALS
BODY MASS INDEX: 33.45 KG/M2 | TEMPERATURE: 97.9 F | HEIGHT: 75 IN | SYSTOLIC BLOOD PRESSURE: 158 MMHG | WEIGHT: 269 LBS | HEART RATE: 98 BPM | DIASTOLIC BLOOD PRESSURE: 82 MMHG | OXYGEN SATURATION: 95 %

## 2017-07-28 DIAGNOSIS — R07.89 LEFT-SIDED CHEST WALL PAIN: ICD-10-CM

## 2017-07-28 DIAGNOSIS — S29.011A CHEST WALL MUSCLE STRAIN, INITIAL ENCOUNTER: ICD-10-CM

## 2017-07-28 PROCEDURE — 71101 X-RAY EXAM UNILAT RIBS/CHEST: CPT | Mod: TC | Performed by: NURSE PRACTITIONER

## 2017-07-28 PROCEDURE — 99214 OFFICE O/P EST MOD 30 MIN: CPT | Performed by: NURSE PRACTITIONER

## 2017-07-28 RX ORDER — CYCLOBENZAPRINE HCL 10 MG
10 TABLET ORAL 3 TIMES DAILY PRN
Qty: 15 TAB | Refills: 0 | Status: SHIPPED | OUTPATIENT
Start: 2017-07-28 | End: 2021-05-21

## 2017-07-28 RX ORDER — MELOXICAM 7.5 MG/1
7.5 TABLET ORAL DAILY
Qty: 7 TAB | Refills: 0 | Status: SHIPPED | OUTPATIENT
Start: 2017-07-28 | End: 2021-05-21

## 2017-07-28 ASSESSMENT — ENCOUNTER SYMPTOMS
WHEEZING: 0
CHILLS: 0
SENSORY CHANGE: 0
TINGLING: 0
PALPITATIONS: 0
COUGH: 0
BRUISES/BLEEDS EASILY: 0
MYALGIAS: 1
FEVER: 0
BACK PAIN: 1
ORTHOPNEA: 0
DIZZINESS: 0
SHORTNESS OF BREATH: 0
WEAKNESS: 0

## 2017-07-28 NOTE — PROGRESS NOTES
Subjective:      Adolfo De La Rosa is a 31 y.o. male who presents with Back Pain            Back Pain  Pertinent negatives include no chest pain, fever, tingling or weakness.   Adolfo is a 31 year old male who is here for left side chest wall pain.  [place] and hit his left side on a rock.  pain got better and denies SOB, chest tightness or difficulty breathing.  played golf last week and felt pain on his left side again. Has been using Tiger Balm and Arnica, cool packs, Aleve but not working for pain.  can tolerated pain during day but nighttime he is unable to sleep comfortably. Nonsmoker. 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 Requesting work note.    PMH:  has a past medical history of GERD (gastroesophageal reflux disease).  MEDS:   Current outpatient prescriptions:   •  cyclobenzaprine (FLEXERIL) 10 MG Tab, Take 1 Tab by mouth 3 times a day as needed. Causes drowsiness, do not drive or work while using, Disp: 15 Tab, Rfl: 0  •  meloxicam (MOBIC) 7.5 MG Tab, Take 1 Tab by mouth every day., Disp: 7 Tab, Rfl: 0  •  omeprazole (PRILOSEC) 20 MG delayed-release capsule, Take 20 mg by mouth every day., Disp: , Rfl:   •  hydrocodone-acetaminophen (NORCO) 5-325 MG Tab per tablet, Take 1-2 Tabs by mouth every 6 hours as needed., Disp: 16 Tab, Rfl: 0  •  cyclobenzaprine (FLEXERIL) 10 MG Tab, Take 1 Tab by mouth 3 times a day as needed for Mild Pain or Muscle Spasms ((or use qhs) (medicine will cause drowsiness))., Disp: 15 Tab, Rfl: 0  •  ibuprofen (MOTRIN) 200 MG Tab, Take 200 mg by mouth every 6 hours as needed., Disp: , Rfl:   ALLERGIES: No Known Allergies  SURGHX:   Past Surgical History   Procedure Laterality Date   • Hernia repair       SOCHX:  reports that he has never smoked. He has never used smokeless tobacco. He reports that he drinks alcohol. He reports that he does not use illicit drugs.  FH: Family history was reviewed,  "no pertinent findings to report    Review of Systems   Constitutional: Negative for fever, chills and malaise/fatigue.   Respiratory: Negative for cough, shortness of breath and wheezing.    Cardiovascular: Negative for chest pain, palpitations and orthopnea.   Musculoskeletal: Positive for myalgias and back pain.   Neurological: Negative for dizziness, tingling, sensory change and weakness.   Endo/Heme/Allergies: Does not bruise/bleed easily.   All other systems reviewed and are negative.         Objective:     /82 mmHg  Pulse 98  Temp(Src) 36.6 °C (97.9 °F)  Ht 1.905 m (6' 3\")  Wt 122.018 kg (269 lb)  BMI 33.62 kg/m2  SpO2 95%     Physical Exam   Constitutional: He is oriented to person, place, and time. Vital signs are normal. He appears well-developed and well-nourished. He is active and cooperative.  Non-toxic appearance. He does not have a sickly appearance. He does not appear ill. No distress.   HENT:   Head: Normocephalic.   Eyes: Conjunctivae and EOM are normal. Pupils are equal, round, and reactive to light.   Cardiovascular: Normal rate and regular rhythm.    Pulmonary/Chest: Effort normal and breath sounds normal. No accessory muscle usage. No respiratory distress. He has no decreased breath sounds. He has no wheezes. He has no rhonchi. He has no rales. Chest wall is not dull to percussion. He exhibits tenderness. He exhibits no mass, no bony tenderness, no laceration, no crepitus, no edema, no deformity, no swelling and no retraction.   Musculoskeletal:        Thoracic back: He exhibits tenderness, bony tenderness, pain and spasm. He exhibits no swelling, no edema and no deformity.        Back:    Lateral aspect of left side of lower rib cage tenderness on palpation   Neurological: He is alert and oriented to person, place, and time.   Skin: Skin is warm and dry. He is not diaphoretic. No erythema.   Vitals reviewed.         XRAY FINDINGS:  No displaced rib fractures or malalignment. No " pleural fluid or pneumothorax. Soft tissues are unremarkable     Assessment/Plan:     1. Left-sided chest wall pain    - [id number] (WITH 1-VIEW CXR) LEFT; Future    2. Chest wall muscle strain, initial encounter    - cyclobenzaprine (FLEXERIL) 10 MG Tab; Take 1 Tab by mouth 3 times a day as needed. Causes drowsiness, do not drive or work while using  Dispense: 15 Tab; Refill: 0  - meloxicam (MOBIC) 7.5 MG Tab; Take 1 Tab by mouth every day.  Dispense: 7 Tab; Refill: 0    May use cool compresses for swelling and warm compresses for muscle stiffness   May perform muscle stretches as tolerated after warm compresses to maintain mobility, avoid abdominal crunches  May continue Flexeril prn when at home only   May apply topical analgesics prn  Perform proper body mechanics with lifting, twisting, bending and reaching. Ask for assistance with heavy objects  Monitor for numbness/tingling in upper extremities, decreased ROM with lifting difficulty, SOB- need re-evaluation  Work note provided

## 2017-07-28 NOTE — Clinical Note
July 28, 2017       Patient: Adolfo De La Rosa   YOB: 1985   Date of Visit: 7/28/2017         To Whom It May Concern:    It is my medical opinion that Adolfo De La Rosa be excused from work due to chest wall muscle strain, may return on 8/2/17 .    If you have any questions or concerns, please don't hesitate to call [phone]          Sincerely,          KIRK [email or web address].[email or web address].  Electronically Signed

## 2017-07-28 NOTE — MR AVS SNAPSHOT
"        Adolfo Rodas Rj   2017 9:30 AM   Office Visit   MRN: [id number]    Department:  [facility]   Dept Phone:  [phone]    Description:  Male : 1985   Provider:  VIRGINIA Swain           Reason for Visit     Back Pain X 1 week, Left side back pain worsened after playing golf       Allergies as of 2017     No Known Allergies      You were diagnosed with     Left-sided chest wall pain   [335195]       Chest wall muscle strain, initial encounter   [842827]         Vital Signs     Blood Pressure Pulse Temperature Height Weight Body Mass Index    158/82 mmHg 98 36.6 °C (97.9 °F) 1.905 m (6' 3\") 122.018 kg (269 lb) 33.62 kg/m2    Oxygen Saturation Smoking Status                95% Never Smoker           Basic Information     Date Of Birth Sex Race Ethnicity Preferred Language    1985 Male White Non- English      Health Maintenance        Date Due Completion Dates    IMM DTaP/Tdap/Td Vaccine (1 - Tdap) 2002 ---    IMM INFLUENZA (1) 2017 ---            Current Immunizations     No immunizations on file.      Below and/or attached are the medications your provider expects you to take. Review all of your home medications and newly ordered medications with your provider and/or pharmacist. Follow medication instructions as directed by your provider and/or pharmacist. Please keep your medication list with you and share with your provider. Update the information when medications are discontinued, doses are changed, or new medications (including over-the-counter products) are added; and carry medication information at all times in the event of emergency situations     Allergies:  No Known Allergies          Medications  Valid as of: 2017 - 10:28 AM    Generic Name Brand Name Tablet Size Instructions for use    Cyclobenzaprine HCl (Tab) FLEXERIL 10 MG Take 1 Tab by mouth 3 times a day as needed for Mild Pain or Muscle Spasms ((or use qhs) (medicine will " cause drowsiness)).        Cyclobenzaprine HCl (Tab) FLEXERIL 10 MG Take 1 Tab by mouth 3 times a day as needed. Causes drowsiness, do not drive or work while using        Hydrocodone-Acetaminophen (Tab) NORCO 5-325 MG Take 1-2 Tabs by mouth every 6 hours as needed.        Ibuprofen (Tab) MOTRIN 200 MG Take 200 mg by mouth every 6 hours as needed.        Meloxicam (Tab) MOBIC 7.5 MG Take 1 Tab by mouth every day.        Omeprazole (CAPSULE DELAYED RELEASE) PRILOSEC 20 MG Take 20 mg by mouth every day.        .                 Medicines prescribed today were sent to:     [facility] - [place], [place] - [address]    [address] [place] [address]    Phone: [phone] Fax: [phone]    Open 24 Hours?: No      Medication refill instructions:       If your prescription bottle indicates you have medication refills left, it is not necessary to call your provider’s office. Please contact your pharmacy and they will refill your medication.    If your prescription bottle indicates you do not have any refills left, you may request refills at any time through one of the following ways: The online [employer] system (except Urgent Care), by calling your provider’s office, or by asking your pharmacy to contact your provider’s office with a refill request. Medication refills are processed only during regular business hours and may not be available until the next business day. Your provider may request additional information or to have a follow-up visit with you prior to refilling your medication.   *Please Note: Medication refills are assigned a new Rx number when refilled electronically. Your pharmacy may indicate that no refills were authorized even though a new prescription for the same medication is available at the pharmacy. Please request the medicine by name with the pharmacy before contacting your provider for a refill.        Your To Do List     Future Labs/Procedures Complete By Expires    [id number] (WITH 1-VIEW  CXR) LEFT  As directed 1/28/2018         obiwon Access Code: [email or web address]  Expires: 8/27/2017 10:28 AM    obiwon  A secure, online tool to manage your health information     [employer]’s obiwon® is a secure, online tool that connects you to your personalized health information from the privacy of your home -- day or night - making it very easy for you to manage your healthcare. Once the activation process is completed, you can even access your medical information using the obiwon gonzalo, which is available for free in the [employer] store or Google Play store.     obiwon provides the following levels of access (as shown below):   My Chart Features   [facility] Primary [facility] Doctor [facility]  Specialists [facility]  Urgent  Care [facility]  Primary Care  Doctor   Email your healthcare team securely and privately 24/7 X X X    Manage appointments: schedule your next appointment; view details of past/upcoming appointments X      Request prescription refills. X      View recent personal medical records, including lab and immunizations X X X X   View health record, including health history, allergies, medications X X X X   Read reports about your outpatient visits, procedures, consult and ER notes X X X X   See your discharge summary, which is a recap of your hospital and/or ER visit that includes your diagnosis, lab results, and care plan. X X       How to register for obiwon:  1. Go to  [email or web address].  2. Click on the Sign Up Now box, which takes you to the New Member Sign Up page. You will need to provide the following information:  a. Enter your obiwon Access Code exactly as it appears at the top of this page. (You will not need to use this code after you’ve completed the sign-up process. If you do not sign up before the expiration date, you must request a new code.)   b. Enter your date of birth.   c. Enter your home email address.   d. Click Submit, and follow the next screen’s instructions.  3. Create  a MyChart ID. This will be your [employer] login ID and cannot be changed, so think of one that is secure and easy to remember.  4. Create a Online [employer] password. You can change your password at any time.  5. Enter your Password Reset Question and Answer. This can be used at a later time if you forget your password.   6. Enter your e-mail address. This allows you to receive e-mail notifications when new information is available in [employer].  7. Click Sign Up. You can now view your health information.    For assistance activating your [employer] account, call [phone]

## 2020-02-12 ENCOUNTER — APPOINTMENT (OUTPATIENT)
Dept: RADIOLOGY | Facility: IMAGING CENTER | Age: 35
End: 2020-02-12
Attending: PHYSICIAN ASSISTANT
Payer: COMMERCIAL

## 2020-02-12 ENCOUNTER — OFFICE VISIT (OUTPATIENT)
Dept: URGENT CARE | Facility: CLINIC | Age: 35
End: 2020-02-12
Payer: COMMERCIAL

## 2020-02-12 VITALS
DIASTOLIC BLOOD PRESSURE: 92 MMHG | TEMPERATURE: 98.1 F | RESPIRATION RATE: 18 BRPM | WEIGHT: 278 LBS | OXYGEN SATURATION: 95 % | HEIGHT: 75 IN | BODY MASS INDEX: 34.57 KG/M2 | HEART RATE: 107 BPM | SYSTOLIC BLOOD PRESSURE: 150 MMHG

## 2020-02-12 DIAGNOSIS — R05.9 COUGH: ICD-10-CM

## 2020-02-12 DIAGNOSIS — R06.2 WHEEZING: ICD-10-CM

## 2020-02-12 DIAGNOSIS — J40 BRONCHITIS: ICD-10-CM

## 2020-02-12 PROCEDURE — 71046 X-RAY EXAM CHEST 2 VIEWS: CPT | Mod: TC | Performed by: PHYSICIAN ASSISTANT

## 2020-02-12 PROCEDURE — 99214 OFFICE O/P EST MOD 30 MIN: CPT | Mod: 25 | Performed by: PHYSICIAN ASSISTANT

## 2020-02-12 PROCEDURE — 94640 AIRWAY INHALATION TREATMENT: CPT | Performed by: PHYSICIAN ASSISTANT

## 2020-02-12 RX ORDER — AZITHROMYCIN 250 MG/1
TABLET, FILM COATED ORAL
Qty: 6 TAB | Refills: 0 | Status: SHIPPED | OUTPATIENT
Start: 2020-02-12 | End: 2021-05-21

## 2020-02-12 RX ORDER — PREDNISONE 20 MG/1
40 TABLET ORAL DAILY
Qty: 10 TAB | Refills: 0 | Status: SHIPPED | OUTPATIENT
Start: 2020-02-12 | End: 2020-02-17

## 2020-02-12 RX ORDER — IPRATROPIUM BROMIDE AND ALBUTEROL SULFATE 2.5; .5 MG/3ML; MG/3ML
3 SOLUTION RESPIRATORY (INHALATION) ONCE
Status: COMPLETED | OUTPATIENT
Start: 2020-02-12 | End: 2020-02-12

## 2020-02-12 RX ADMIN — IPRATROPIUM BROMIDE AND ALBUTEROL SULFATE 3 ML: 2.5; .5 SOLUTION RESPIRATORY (INHALATION) at 14:47

## 2020-02-12 ASSESSMENT — ENCOUNTER SYMPTOMS
FEVER: 0
NECK PAIN: 0
COUGH: 1
SORE THROAT: 0
EYE PAIN: 0
MYALGIAS: 0
CHILLS: 0
NAUSEA: 0
HEADACHES: 0
PALPITATIONS: 0
SPUTUM PRODUCTION: 1
VOMITING: 0
EYE REDNESS: 0
ABDOMINAL PAIN: 0
DIZZINESS: 0
WHEEZING: 1
SHORTNESS OF BREATH: 0
DIARRHEA: 0
EYE DISCHARGE: 0
SINUS PAIN: 0

## 2020-02-12 NOTE — PROGRESS NOTES
Subjective:      Adolfo De La Rosa is a 34 y.o. male who presents with Cough (x1wk, chest congestion, rattling in chest)            HPI  34-year-old male presents to urgent care with new problem of productive cough and associated wheezing. Denies shortness of breath. Denies fevers.  Patient denies body aches, sore throat, sinus pain, or congestion.   Patient took mucinex with some relief. Denies sick contacts. No hx of lung disease.   Denies other associated aggravating or alleviating factors.     Review of Systems   Constitutional: Positive for malaise/fatigue. Negative for chills and fever.   HENT: Negative for congestion, ear pain, sinus pain and sore throat.    Eyes: Negative for pain, discharge and redness.   Respiratory: Positive for cough, sputum production and wheezing. Negative for shortness of breath.    Cardiovascular: Negative for chest pain and palpitations.   Gastrointestinal: Negative for abdominal pain, diarrhea, nausea and vomiting.   Genitourinary: Negative for dysuria.   Musculoskeletal: Negative for myalgias and neck pain.   Skin: Negative for rash.   Neurological: Negative for dizziness and headaches.   Endo/Heme/Allergies: Negative for environmental allergies.       Past Medical History:   Diagnosis Date   • GERD (gastroesophageal reflux disease)      Current Outpatient Medications on File Prior to Visit   Medication Sig Dispense Refill   • Multiple Vitamin (MULTI-VITAMIN DAILY PO) Take  by mouth.     • cyclobenzaprine (FLEXERIL) 10 MG Tab Take 1 Tab by mouth 3 times a day as needed. Causes drowsiness, do not drive or work while using (Patient not taking: Reported on 2/12/2020) 15 Tab 0   • meloxicam (MOBIC) 7.5 MG Tab Take 1 Tab by mouth every day. (Patient not taking: Reported on 2/12/2020) 7 Tab 0   • hydrocodone-acetaminophen (NORCO) 5-325 MG Tab per tablet Take 1-2 Tabs by mouth every 6 hours as needed. (Patient not taking: Reported on 2/12/2020) 16 Tab 0   • cyclobenzaprine (FLEXERIL) 10  "MG Tab Take 1 Tab by mouth 3 times a day as needed for Mild Pain or Muscle Spasms ((or use qhs) (medicine will cause drowsiness)). (Patient not taking: Reported on 2/12/2020) 15 Tab 0   • ibuprofen (MOTRIN) 200 MG Tab Take 200 mg by mouth every 6 hours as needed.     • omeprazole (PRILOSEC) 20 MG delayed-release capsule Take 20 mg by mouth every day.       No current facility-administered medications on file prior to visit.      No Known Allergies  Social History     Tobacco Use   • Smoking status: Never Smoker   • Smokeless tobacco: Never Used   Substance Use Topics   • Alcohol use: Yes      Objective:     /92 (BP Location: Left arm, Patient Position: Sitting, BP Cuff Size: Large adult)   Pulse (!) 107   Temp 36.7 °C (98.1 °F) (Temporal)   Resp 18   Ht 1.905 m (6' 3\")   Wt (!) 126.1 kg (278 lb)   SpO2 95%   BMI 34.75 kg/m²      Physical Exam  Vitals signs reviewed.   Constitutional:       General: He is not in acute distress.     Appearance: Normal appearance. He is well-developed. He is not ill-appearing.   HENT:      Head: Normocephalic and atraumatic.      Right Ear: Tympanic membrane and ear canal normal.      Left Ear: Tympanic membrane and ear canal normal.      Nose: Mucosal edema and congestion present. No rhinorrhea.      Mouth/Throat:      Mouth: Mucous membranes are moist.      Pharynx: Posterior oropharyngeal erythema present. No oropharyngeal exudate.   Eyes:      Extraocular Movements: Extraocular movements intact.      Conjunctiva/sclera: Conjunctivae normal.   Neck:      Musculoskeletal: Normal range of motion and neck supple.   Cardiovascular:      Rate and Rhythm: Normal rate and regular rhythm.      Heart sounds: Normal heart sounds.   Pulmonary:      Effort: Pulmonary effort is normal. No respiratory distress.      Breath sounds: Wheezing and rhonchi present.   Musculoskeletal: Normal range of motion.   Skin:     General: Skin is warm and dry.   Neurological:      General: No focal " deficit present.      Mental Status: He is alert and oriented to person, place, and time.   Psychiatric:         Mood and Affect: Mood normal.         Behavior: Behavior normal.         Thought Content: Thought content normal.         Judgment: Judgment normal.                 Assessment/Plan:     1. Bronchitis  DX-CHEST-2 VIEWS    ipratropium-albuterol (DUONEB) nebulizer solution    azithromycin (ZITHROMAX) 250 MG Tab    predniSONE (DELTASONE) 20 MG Tab   2. Wheezing  ipratropium-albuterol (DUONEB) nebulizer solution     CXR:  IMPRESSION:  1. No active cardiopulmonary abnormalities are identified.    Patient given DuoNeb treatment in office with some improvement in wheezing and rhonchi heard on lung auscultation.    PT should follow up with PCP in 1-2 days for re-evaluation if symptoms have not improved.  Discussed red flags and reasons to return to UC or ED.  Pt and/or family verbalized understanding of diagnosis and follow up instructions and was offered informational handout on diagnosis.  PT discharged.     Recommend patient take Mucinex rather than Mucinex D secondary to elevated blood pressure.  Your blood pressure is elevated here in Urgent Care. Please monitor your blood pressure over the next several days. If your blood pressure continues to be 120/80 or higher please contact your physician for blood pressure management.

## 2020-06-17 ENCOUNTER — APPOINTMENT (RX ONLY)
Dept: URBAN - METROPOLITAN AREA CLINIC 4 | Facility: CLINIC | Age: 35
Setting detail: DERMATOLOGY
End: 2020-06-17

## 2020-06-17 DIAGNOSIS — D22 MELANOCYTIC NEVI: ICD-10-CM

## 2020-06-17 DIAGNOSIS — Z71.89 OTHER SPECIFIED COUNSELING: ICD-10-CM

## 2020-06-17 DIAGNOSIS — L82.1 OTHER SEBORRHEIC KERATOSIS: ICD-10-CM

## 2020-06-17 DIAGNOSIS — L72.0 EPIDERMAL CYST: ICD-10-CM

## 2020-06-17 DIAGNOSIS — L81.4 OTHER MELANIN HYPERPIGMENTATION: ICD-10-CM

## 2020-06-17 DIAGNOSIS — L57.8 OTHER SKIN CHANGES DUE TO CHRONIC EXPOSURE TO NONIONIZING RADIATION: ICD-10-CM

## 2020-06-17 DIAGNOSIS — D18.0 HEMANGIOMA: ICD-10-CM

## 2020-06-17 PROBLEM — D18.01 HEMANGIOMA OF SKIN AND SUBCUTANEOUS TISSUE: Status: ACTIVE | Noted: 2020-06-17

## 2020-06-17 PROBLEM — D22.5 MELANOCYTIC NEVI OF TRUNK: Status: ACTIVE | Noted: 2020-06-17

## 2020-06-17 PROCEDURE — ? ADDITIONAL NOTES

## 2020-06-17 PROCEDURE — 99203 OFFICE O/P NEW LOW 30 MIN: CPT

## 2020-06-17 PROCEDURE — ? COUNSELING

## 2020-06-17 ASSESSMENT — LOCATION DETAILED DESCRIPTION DERM
LOCATION DETAILED: LEFT PROXIMAL DORSAL FOREARM
LOCATION DETAILED: LEFT VENTRAL PROXIMAL FOREARM
LOCATION DETAILED: LEFT SUPERIOR LATERAL BUCCAL CHEEK
LOCATION DETAILED: RIGHT PROXIMAL DORSAL FOREARM
LOCATION DETAILED: INFERIOR THORACIC SPINE
LOCATION DETAILED: LEFT LATERAL INFERIOR EYELID
LOCATION DETAILED: RIGHT INFERIOR MEDIAL MIDBACK
LOCATION DETAILED: STERNAL NOTCH
LOCATION DETAILED: LEFT SUPERIOR MEDIAL LOWER BACK
LOCATION DETAILED: RIGHT VENTRAL DISTAL FOREARM
LOCATION DETAILED: RIGHT SUPERIOR MEDIAL MIDBACK

## 2020-06-17 ASSESSMENT — LOCATION SIMPLE DESCRIPTION DERM
LOCATION SIMPLE: RIGHT FOREARM
LOCATION SIMPLE: UPPER BACK
LOCATION SIMPLE: LEFT LOWER BACK
LOCATION SIMPLE: LEFT INFERIOR EYELID
LOCATION SIMPLE: CHEST
LOCATION SIMPLE: RIGHT LOWER BACK
LOCATION SIMPLE: LEFT FOREARM
LOCATION SIMPLE: LEFT CHEEK

## 2020-06-17 ASSESSMENT — LOCATION ZONE DERM
LOCATION ZONE: EYELID
LOCATION ZONE: FACE
LOCATION ZONE: TRUNK
LOCATION ZONE: ARM

## 2020-06-17 NOTE — HPI: SKIN LESION
Is This A New Presentation, Or A Follow-Up?: Skin Lesion
What Type Of Note Output Would You Prefer (Optional)?: Standard Output
How Severe Is Your Skin Lesion?: mild
Has Your Skin Lesion Been Treated?: not been treated
Additional History: Per patient wants removed.

## 2020-06-17 NOTE — PROCEDURE: ADDITIONAL NOTES
Detail Level: Simple
Additional Notes: Reassure and observe for any changes. \\nIncludes spot of concern mentioned on intake.

## 2020-06-17 NOTE — HPI: FULL BODY SKIN EXAMINATION
How Severe Are Your Spot(S)?: mild
What Type Of Note Output Would You Prefer (Optional)?: Standard Output
What Is The Reason For Today's Visit?: Full Body Skin Examination
What Is The Reason For Today's Visit? (Being Monitored For X): concerning skin lesions on an annual basis
Additional History: Patient grew up in [place], [place] and [place]. History of sunburns but not blistering. No history of tanning beds.

## 2020-07-15 ENCOUNTER — OFFICE VISIT (OUTPATIENT)
Dept: URGENT CARE | Facility: CLINIC | Age: 35
End: 2020-07-15
Payer: COMMERCIAL

## 2020-07-15 VITALS
SYSTOLIC BLOOD PRESSURE: 170 MMHG | TEMPERATURE: 96.8 F | BODY MASS INDEX: 34.82 KG/M2 | HEART RATE: 76 BPM | OXYGEN SATURATION: 96 % | HEIGHT: 75 IN | DIASTOLIC BLOOD PRESSURE: 120 MMHG | WEIGHT: 280 LBS | RESPIRATION RATE: 16 BRPM

## 2020-07-15 DIAGNOSIS — R07.89 RIGHT-SIDED CHEST WALL PAIN: ICD-10-CM

## 2020-07-15 DIAGNOSIS — I10 ESSENTIAL HYPERTENSION: ICD-10-CM

## 2020-07-15 PROCEDURE — 99204 OFFICE O/P NEW MOD 45 MIN: CPT | Performed by: INTERNAL MEDICINE

## 2020-07-15 RX ORDER — LOSARTAN POTASSIUM AND HYDROCHLOROTHIAZIDE 12.5; 1 MG/1; MG/1
1 TABLET ORAL DAILY
Qty: 30 TAB | Refills: 0 | Status: SHIPPED | OUTPATIENT
Start: 2020-07-15 | End: 2021-05-21

## 2020-07-15 RX ORDER — PREDNISONE 20 MG/1
20 TABLET ORAL DAILY
Qty: 5 TAB | Refills: 0 | Status: SHIPPED | OUTPATIENT
Start: 2020-07-15 | End: 2020-07-20

## 2020-07-15 RX ORDER — METHOCARBAMOL 750 MG/1
750 TABLET, FILM COATED ORAL 4 TIMES DAILY PRN
Qty: 30 TAB | Refills: 0 | Status: SHIPPED | OUTPATIENT
Start: 2020-07-15 | End: 2020-07-23

## 2020-07-15 ASSESSMENT — ENCOUNTER SYMPTOMS
WEAKNESS: 0
FEVER: 0
NECK PAIN: 0
MYALGIAS: 1
ABDOMINAL PAIN: 0
NUMBNESS: 0

## 2020-07-15 NOTE — PROGRESS NOTES
Subjective:     Adolfo De La Rosa is a 34 y.o. male who presents for Muscle Pain ( right side Extreme Rib muscle strained, very painful x2 weeks )  Came in with complaints of right-sided chest pain started when he took a bad golf swing, and then it was getting better then he went to play golf again it got worse and also at work he has to lift up to heavy objects and that is making it worse it is especially worse in the morning and then it gets better    Pain is worse with inspiration or movement or cough    Patient also complains of high blood pressure and is not taking anything for it except supplements and he also needs to establish a PCP     Chest Injury   This is a new problem. The current episode started 1 to 4 weeks ago. The problem occurs constantly. The problem has been waxing and waning. Associated symptoms include chest pain (The right side) and myalgias. Pertinent negatives include no abdominal pain, fever, neck pain, numbness or weakness.     Past Medical History:   Diagnosis Date   • GERD (gastroesophageal reflux disease)      Past Surgical History:   Procedure Laterality Date   • HERNIA REPAIR       Social History     Socioeconomic History   • Marital status: Single     Spouse name: Not on file   • Number of children: Not on file   • Years of education: Not on file   • Highest education level: Not on file   Occupational History   • Not on file   Social Needs   • Financial resource strain: Not on file   • Food insecurity     Worry: Not on file     Inability: Not on file   • Transportation needs     Medical: Not on file     Non-medical: Not on file   Tobacco Use   • Smoking status: Never Smoker   • Smokeless tobacco: Never Used   Substance and Sexual Activity   • Alcohol use: Yes   • Drug use: No   • Sexual activity: Not on file   Lifestyle   • Physical activity     Days per week: Not on file     Minutes per session: Not on file   • Stress: Not on file   Relationships   • Social connections     Talks  "on phone: Not on file     Gets together: Not on file     Attends Che[employer] service: Not on file     Active member of club or organization: Not on file     Attends meetings of clubs or organizations: Not on file     Relationship status: Not on file   • Intimate partner violence     Fear of current or ex partner: Not on file     Emotionally abused: Not on file     Physically abused: Not on file     Forced sexual activity: Not on file   Other Topics Concern   • Not on file   Social History Narrative   • Not on file    History reviewed. No pertinent family history. Review of Systems   Constitutional: Negative for fever.   Cardiovascular: Positive for chest pain (The right side).   Gastrointestinal: Negative for abdominal pain.   Musculoskeletal: Positive for myalgias. Negative for neck pain.   Neurological: Negative for weakness and numbness.   All other systems reviewed and are negative.  No Known Allergies   Objective:   BP (!) 170/120   Pulse 76   Temp 36 °C (96.8 °F) (Temporal)   Resp 16   Ht 1.905 m (6' 3\")   Wt (!) 127 kg (280 lb)   SpO2 96%   BMI 35.00 kg/m²   Physical Exam  Constitutional:       General: He is not in acute distress.     Appearance: He is well-developed.   HENT:      Head: Normocephalic and atraumatic.      Mouth/Throat:      Mouth: Mucous membranes are moist.      Pharynx: Oropharynx is clear.   Eyes:      Conjunctiva/sclera: Conjunctivae normal.   Neck:      Musculoskeletal: No neck rigidity.   Cardiovascular:      Rate and Rhythm: Normal rate and regular rhythm.   Pulmonary:      Effort: Pulmonary effort is normal. No respiratory distress.      Breath sounds: Normal breath sounds.   Chest:      Chest wall: Tenderness (Right-sided-minimal tenderness present in the axillary area completely reproducible) present.   Lymphadenopathy:      Cervical: No cervical adenopathy.   Skin:     General: Skin is warm and dry.      Capillary Refill: Capillary refill takes less than 2 seconds. "   Neurological:      Mental Status: He is alert and oriented to person, place, and time.      Sensory: No sensory deficit.      Deep Tendon Reflexes: Reflexes are normal and symmetric.   Psychiatric:         Mood and Affect: Mood normal.         Behavior: Behavior normal.           Assessment/Plan:   Assessment    1. Right-sided chest wall pain  - predniSONE (DELTASONE) 20 MG Tab; Take 1 Tab by mouth every day for 5 days.  Dispense: 5 Tab; Refill: 0  - methocarbamol (ROBAXIN) 750 MG Tab; Take 1 Tab by mouth 4 times a day as needed for up to 8 days.  Dispense: 30 Tab; Refill: 0    2. Essential hypertension  - losartan-hydrochlorothiazide (HYZAAR) 100-12.5 MG per tablet; Take 1 Tab by mouth every day.  Dispense: 30 Tab; Refill: 0  - REFERRAL TO FAMILY PRACTICE    Advised patient not being on his blood pressure and to get it under control made a referral for PCP and also will start him on Hyzaar once a day    Also advised patient to take Tylenol 1 g 3 times a day, ibuprofen 800 mg 3 times a day as needed for pain  Differential diagnosis, natural history, supportive care, and indications for immediate follow-up discussed.

## 2020-07-15 NOTE — LETTER
July 15, 2020       Patient: Adolfo De La Rosa   YOB: 1985   Date of Visit: 7/15/2020         To Whom It May Concern:    In my medical opinion, I recommend that Adolfo De La Rosa return to light duty with the following restrictions not to lift more than 10 lbs for one week    If you have any questions or concerns, please don't hesitate to call [phone]          Sincerely,          Azeem Mullins M.D.  Electronically Signed

## 2021-02-08 ENCOUNTER — TELEPHONE (OUTPATIENT)
Dept: SCHEDULING | Facility: IMAGING CENTER | Age: 36
End: 2021-02-08

## 2021-02-26 ENCOUNTER — APPOINTMENT (RX ONLY)
Dept: URBAN - METROPOLITAN AREA CLINIC 4 | Facility: CLINIC | Age: 36
Setting detail: DERMATOLOGY
End: 2021-02-26

## 2021-02-26 VITALS — HEIGHT: 75 IN | WEIGHT: 275 LBS

## 2021-02-26 DIAGNOSIS — Q819 OTHER SPECIFIED ANOMALIES OF SKIN: ICD-10-CM

## 2021-02-26 DIAGNOSIS — L259 CONTACT DERMATITIS AND OTHER ECZEMA, UNSPECIFIED CAUSE: ICD-10-CM

## 2021-02-26 DIAGNOSIS — D18.0 HEMANGIOMA: ICD-10-CM

## 2021-02-26 DIAGNOSIS — Q826 OTHER SPECIFIED ANOMALIES OF SKIN: ICD-10-CM

## 2021-02-26 DIAGNOSIS — L70.0 ACNE VULGARIS: ICD-10-CM

## 2021-02-26 DIAGNOSIS — Q828 OTHER SPECIFIED ANOMALIES OF SKIN: ICD-10-CM

## 2021-02-26 PROBLEM — L23.9 ALLERGIC CONTACT DERMATITIS, UNSPECIFIED CAUSE: Status: ACTIVE | Noted: 2021-02-26

## 2021-02-26 PROBLEM — L85.8 OTHER SPECIFIED EPIDERMAL THICKENING: Status: ACTIVE | Noted: 2021-02-26

## 2021-02-26 PROBLEM — D18.01 HEMANGIOMA OF SKIN AND SUBCUTANEOUS TISSUE: Status: ACTIVE | Noted: 2021-02-26

## 2021-02-26 PROCEDURE — ? COUNSELING

## 2021-02-26 PROCEDURE — ? PRESCRIPTION

## 2021-02-26 PROCEDURE — 99213 OFFICE O/P EST LOW 20 MIN: CPT

## 2021-02-26 PROCEDURE — ? ADDITIONAL NOTES

## 2021-02-26 RX ORDER — CLINDAMYCIN PHOSPHATE 10 MG/ML
LOTION TOPICAL BID
Qty: 1 | Refills: 12 | Status: ERX | COMMUNITY
Start: 2021-02-26

## 2021-02-26 RX ORDER — TRIAMCINOLONE ACETONIDE 1 MG/G
CREAM TOPICAL BID
Qty: 1 | Refills: 1 | Status: ERX | COMMUNITY
Start: 2021-02-26

## 2021-02-26 RX ADMIN — TRIAMCINOLONE ACETONIDE: 1 CREAM TOPICAL at 00:00

## 2021-02-26 RX ADMIN — CLINDAMYCIN PHOSPHATE: 10 LOTION TOPICAL at 00:00

## 2021-02-26 ASSESSMENT — LOCATION ZONE DERM
LOCATION ZONE: TRUNK
LOCATION ZONE: ARM

## 2021-02-26 ASSESSMENT — LOCATION DETAILED DESCRIPTION DERM
LOCATION DETAILED: EPIGASTRIC SKIN
LOCATION DETAILED: RIGHT MEDIAL SUPERIOR CHEST
LOCATION DETAILED: LEFT PROXIMAL POSTERIOR UPPER ARM
LOCATION DETAILED: RIGHT SUPERIOR MEDIAL UPPER BACK
LOCATION DETAILED: RIGHT PROXIMAL POSTERIOR UPPER ARM

## 2021-02-26 ASSESSMENT — LOCATION SIMPLE DESCRIPTION DERM
LOCATION SIMPLE: LEFT UPPER ARM
LOCATION SIMPLE: RIGHT UPPER ARM
LOCATION SIMPLE: RIGHT UPPER BACK
LOCATION SIMPLE: ABDOMEN
LOCATION SIMPLE: CHEST

## 2021-02-26 NOTE — PROCEDURE: ADDITIONAL NOTES
Render Risk Assessment In Note?: no
Detail Level: Zone
Additional Notes: Recommend using dove body wash (samples given).

## 2021-02-26 NOTE — PROCEDURE: COUNSELING
Detail Level: Zone
Minocycline Counseling: Patient advised regarding possible photosensitivity and discoloration of the teeth, skin, lips, tongue and gums.  Patient instructed to avoid sunlight, if possible.  When exposed to sunlight, patients should wear protective clothing, sunglasses, and sunscreen.  The patient was instructed to call the office immediately if the following severe adverse effects occur:  hearing changes, easy bruising/bleeding, severe headache, or vision changes.  The patient verbalized understanding of the proper use and possible adverse effects of minocycline.  All of the patient's questions and concerns were addressed.
Benzoyl Peroxide Pregnancy And Lactation Text: This medication is Pregnancy Category C. It is unknown if benzoyl peroxide is excreted in breast milk.
Dapsone Pregnancy And Lactation Text: This medication is Pregnancy Category C and is not considered safe during pregnancy or breast feeding.
Erythromycin Pregnancy And Lactation Text: This medication is Pregnancy Category B and is considered safe during pregnancy. It is also excreted in breast milk.
Dapsone Counseling: I discussed with the patient the risks of dapsone including but not limited to hemolytic anemia, agranulocytosis, rashes, methemoglobinemia, kidney failure, peripheral neuropathy, headaches, GI upset, and liver toxicity.  Patients who start dapsone require monitoring including baseline LFTs and weekly CBCs for the first month, then every month thereafter.  The patient verbalized understanding of the proper use and possible adverse effects of dapsone.  All of the patient's questions and concerns were addressed.
Spironolactone Counseling: Patient advised regarding risks of diarrhea, abdominal pain, hyperkalemia, birth defects (for female patients), liver toxicity and renal toxicity. The patient may need blood work to monitor liver and kidney function and potassium levels while on therapy. The patient verbalized understanding of the proper use and possible adverse effects of spironolactone.  All of the patient's questions and concerns were addressed.
Bactrim Counseling:  I discussed with the patient the risks of sulfa antibiotics including but not limited to GI upset, allergic reaction, drug rash, diarrhea, dizziness, photosensitivity, and yeast infections.  Rarely, more serious reactions can occur including but not limited to aplastic anemia, agranulocytosis, methemoglobinemia, blood dyscrasias, liver or kidney failure, lung infiltrates or desquamative/blistering drug rashes.
Tazorac Pregnancy And Lactation Text: This medication is not safe during pregnancy. It is unknown if this medication is excreted in breast milk.
Doxycycline Counseling:  Patient counseled regarding possible photosensitivity and increased risk for sunburn.  Patient instructed to avoid sunlight, if possible.  When exposed to sunlight, patients should wear protective clothing, sunglasses, and sunscreen.  The patient was instructed to call the office immediately if the following severe adverse effects occur:  hearing changes, easy bruising/bleeding, severe headache, or vision changes.  The patient verbalized understanding of the proper use and possible adverse effects of doxycycline.  All of the patient's questions and concerns were addressed.
Minocycline Pregnancy And Lactation Text: This medication is Pregnancy Category D and not consider safe during pregnancy. It is also excreted in breast milk.
Topical Clindamycin Pregnancy And Lactation Text: This medication is Pregnancy Category B and is considered safe during pregnancy. It is unknown if it is excreted in breast milk.
Use Enhanced Medication Counseling?: No
Bactrim Pregnancy And Lactation Text: This medication is Pregnancy Category D and is known to cause fetal risk.  It is also excreted in breast milk.
Topical Clindamycin Counseling: Patient counseled that this medication may cause skin irritation or allergic reactions.  In the event of skin irritation, the patient was advised to reduce the amount of the drug applied or use it less frequently.   The patient verbalized understanding of the proper use and possible adverse effects of clindamycin.  All of the patient's questions and concerns were addressed.
Isotretinoin Counseling: Patient should get monthly blood tests, not donate blood, not drive at night if vision affected, not share medication, and not undergo elective surgery for 6 months after tx completed. Side effects reviewed, pt to contact office should one occur.
Topical Retinoid counseling:  Patient advised to apply a pea-sized amount only at bedtime and wait 30 minutes after washing their face before applying.  If too drying, patient may add a non-comedogenic moisturizer. The patient verbalized understanding of the proper use and possible adverse effects of retinoids.  All of the patient's questions and concerns were addressed.
Spironolactone Pregnancy And Lactation Text: This medication can cause feminization of the male fetus and should be avoided during pregnancy. The active metabolite is also found in breast milk.
Azithromycin Counseling:  I discussed with the patient the risks of azithromycin including but not limited to GI upset, allergic reaction, drug rash, diarrhea, and yeast infections.
Doxycycline Pregnancy And Lactation Text: This medication is Pregnancy Category D and not consider safe during pregnancy. It is also excreted in breast milk but is considered safe for shorter treatment courses.
Topical Sulfur Applications Counseling: Topical Sulfur Counseling: Patient counseled that this medication may cause skin irritation or allergic reactions.  In the event of skin irritation, the patient was advised to reduce the amount of the drug applied or use it less frequently.   The patient verbalized understanding of the proper use and possible adverse effects of topical sulfur application.  All of the patient's questions and concerns were addressed.
Topical Retinoid Pregnancy And Lactation Text: This medication is Pregnancy Category C. It is unknown if this medication is excreted in breast milk.
Isotretinoin Pregnancy And Lactation Text: This medication is Pregnancy Category X and is considered extremely dangerous during pregnancy. It is unknown if it is excreted in breast milk.
Tetracycline Counseling: Patient counseled regarding possible photosensitivity and increased risk for sunburn.  Patient instructed to avoid sunlight, if possible.  When exposed to sunlight, patients should wear protective clothing, sunglasses, and sunscreen.  The patient was instructed to call the office immediately if the following severe adverse effects occur:  hearing changes, easy bruising/bleeding, severe headache, or vision changes.  The patient verbalized understanding of the proper use and possible adverse effects of tetracycline.  All of the patient's questions and concerns were addressed. Patient understands to avoid pregnancy while on therapy due to potential birth defects.
Birth Control Pills Counseling: Birth Control Pill Counseling: I discussed with the patient the potential side effects of OCPs including but not limited to increased risk of stroke, heart attack, thrombophlebitis, deep venous thrombosis, hepatic adenomas, breast changes, GI upset, headaches, and depression.  The patient verbalized understanding of the proper use and possible adverse effects of OCPs. All of the patient's questions and concerns were addressed.
Sarecycline Counseling: Patient advised regarding possible photosensitivity and discoloration of the teeth, skin, lips, tongue and gums.  Patient instructed to avoid sunlight, if possible.  When exposed to sunlight, patients should wear protective clothing, sunglasses, and sunscreen.  The patient was instructed to call the office immediately if the following severe adverse effects occur:  hearing changes, easy bruising/bleeding, severe headache, or vision changes.  The patient verbalized understanding of the proper use and possible adverse effects of sarecycline.  All of the patient's questions and concerns were addressed.
Benzoyl Peroxide Counseling: Patient counseled that medicine may cause skin irritation and bleach clothing.  In the event of skin irritation, the patient was advised to reduce the amount of the drug applied or use it less frequently.   The patient verbalized understanding of the proper use and possible adverse effects of benzoyl peroxide.  All of the patient's questions and concerns were addressed.
Topical Sulfur Applications Pregnancy And Lactation Text: This medication is Pregnancy Category C and has an unknown safety profile during pregnancy. It is unknown if this topical medication is excreted in breast milk.
Azithromycin Pregnancy And Lactation Text: This medication is considered safe during pregnancy and is also secreted in breast milk.
High Dose Vitamin A Pregnancy And Lactation Text: High dose vitamin A therapy is contraindicated during pregnancy and breast feeding.
Birth Control Pills Pregnancy And Lactation Text: This medication should be avoided if pregnant and for the first 30 days post-partum.
High Dose Vitamin A Counseling: Side effects reviewed, pt to contact office should one occur.
Tazorac Counseling:  Patient advised that medication is irritating and drying.  Patient may need to apply sparingly and wash off after an hour before eventually leaving it on overnight.  The patient verbalized understanding of the proper use and possible adverse effects of tazorac.  All of the patient's questions and concerns were addressed.
Erythromycin Counseling:  I discussed with the patient the risks of erythromycin including but not limited to GI upset, allergic reaction, drug rash, diarrhea, increase in liver enzymes, and yeast infections.

## 2021-03-31 ENCOUNTER — APPOINTMENT (OUTPATIENT)
Dept: MEDICAL GROUP | Facility: MEDICAL CENTER | Age: 36
End: 2021-03-31
Payer: COMMERCIAL

## 2021-05-21 ENCOUNTER — OFFICE VISIT (OUTPATIENT)
Dept: MEDICAL GROUP | Facility: MEDICAL CENTER | Age: 36
End: 2021-05-21
Payer: COMMERCIAL

## 2021-05-21 VITALS
OXYGEN SATURATION: 97 % | WEIGHT: 277.78 LBS | TEMPERATURE: 98.9 F | HEART RATE: 99 BPM | HEIGHT: 75 IN | BODY MASS INDEX: 34.54 KG/M2 | DIASTOLIC BLOOD PRESSURE: 90 MMHG | SYSTOLIC BLOOD PRESSURE: 150 MMHG

## 2021-05-21 DIAGNOSIS — E66.9 OBESITY (BMI 30-39.9): ICD-10-CM

## 2021-05-21 DIAGNOSIS — I10 ESSENTIAL HYPERTENSION: ICD-10-CM

## 2021-05-21 DIAGNOSIS — K21.9 GASTROESOPHAGEAL REFLUX DISEASE WITHOUT ESOPHAGITIS: ICD-10-CM

## 2021-05-21 DIAGNOSIS — Z76.89 ENCOUNTER TO ESTABLISH CARE: ICD-10-CM

## 2021-05-21 DIAGNOSIS — Z00.00 PREVENTATIVE HEALTH CARE: ICD-10-CM

## 2021-05-21 PROCEDURE — 99214 OFFICE O/P EST MOD 30 MIN: CPT | Performed by: PHYSICIAN ASSISTANT

## 2021-05-21 RX ORDER — MULTIVIT WITH MINERALS/LUTEIN
1 TABLET ORAL DAILY
COMMUNITY
End: 2022-12-28

## 2021-05-21 RX ORDER — OMEPRAZOLE 20 MG/1
20 CAPSULE, DELAYED RELEASE ORAL DAILY
COMMUNITY
End: 2021-08-23

## 2021-05-21 RX ORDER — LOSARTAN POTASSIUM 50 MG/1
50 TABLET ORAL DAILY
Qty: 30 TABLET | Refills: 3 | Status: SHIPPED | OUTPATIENT
Start: 2021-05-21 | End: 2021-07-15

## 2021-05-21 ASSESSMENT — PATIENT HEALTH QUESTIONNAIRE - PHQ9: CLINICAL INTERPRETATION OF PHQ2 SCORE: 0

## 2021-05-21 NOTE — PROGRESS NOTES
Subjective:   Adolfo De La Rosa is a 35 y.o. male here today for hypertension, GERD, preventative health care and to establish care.    Essential hypertension  This is a pleasant 35-year-old male accompanied by his fiancée, Noelle Morales.  He is here today to establish care.  History of elevated blood pressure.  Blood pressure typically runs at home between 130 and 140.  It was high last year at the ER.  At that time he believes it was high because of the circumstances of his medical concerns.  He does have a twin brother who is on blood pressure medication.    Gastroesophageal reflux disease without esophagitis  Chronic condition.  Typically will have reflux symptoms when drinking.  Will take omeprazole when needed approximately 4 times a week.  Has been having symptoms for least 5 years maybe longer.  Does consume alcohol 4 times a week at least 3-10 drinks at a time.  Beer causes bloating so he chooses hard liquor and wine.       Current medicines (including changes today)  Current Outpatient Medications   Medication Sig Dispense Refill   • Cholecalciferol (VITAMIN D3) 125 MCG (5000 UT) Cap Take 1 capsule by mouth every day.     • Ascorbic Acid (VITAMIN C) 1000 MG Tab Take 1 tablet by mouth every day.     • B Complex Vitamins (VITAMIN B COMPLEX PO) Take  by mouth.     • omeprazole (PRILOSEC) 20 MG delayed-release capsule Take 20 mg by mouth every day.     • Cranberry-Milk Thistle (LIVER & KIDNEY CLEANSER PO) Take  by mouth.     • losartan (COZAAR) 50 MG Tab Take 1 tablet by mouth every day. 30 tablet 3   • Omeprazole (PRILOSEC PO) omeprazole     • Multiple Vitamin (MULTI-VITAMIN DAILY PO) Take  by mouth.     • ibuprofen (MOTRIN) 200 MG Tab Take 200 mg by mouth every 6 hours as needed.     • omeprazole (PRILOSEC) 20 MG delayed-release capsule Take 20 mg by mouth every day.       No current facility-administered medications for this visit.     He  has a past medical history of GERD (gastroesophageal reflux  "disease).    Social History and Family History were reviewed and updated.    ROS   No chest pain, no shortness of breath, no abdominal pain and all other systems were reviewed and are negative.       Objective:     /90   Pulse 99   Temp 37.2 °C (98.9 °F) (Temporal)   Ht 1.905 m (6' 3\")   Wt (!) 126 kg (277 lb 12.5 oz)   SpO2 97%  Body mass index is 34.72 kg/m².   Physical Exam:  Constitutional: Alert, no distress.  Skin: Warm, dry, good turgor, no rashes in visible areas.  Eye: Equal, round and reactive, conjunctiva clear, lids normal.  ENMT: Lips without lesions, good dentition, oropharynx clear.  Neck: Trachea midline, no masses.   Lymph: No cervical or supraclavicular lymphadenopathy  Respiratory: Unlabored respiratory effort, lungs appear clear, no wheezes.  Cardiovascular: Regular rate rhythm.  Psych: Alert and oriented x3, normal affect and mood.        Assessment and Plan:   The following treatment plan was discussed    1. Essential hypertension  Chronic condition.  New condition noted in chart.  Blood pressure remains elevated.  We will start with losartan 50 mg daily.  If develops side effects contact me to MyChart and cut tablet in half to 25 mg.  I will see him in 3 months for reevaluation.  - losartan (COZAAR) 50 MG Tab; Take 1 tablet by mouth every day.  Dispense: 30 tablet; Refill: 3    2. Gastroesophageal reflux disease without esophagitis  Chronic condition.  Discussed taking omeprazole prior to food and drink.  Further may discuss possible referral to GI for upper endoscopy given the chronicity of his symptoms.    3. Obesity (BMI 30-39.9)  Chronic condition.  Advised to eat healthier.  Cut back alcohol to minimum of 3 daily.  Cut back also on fast food intake.  Expect him to lose at least 10 pounds over the next 3 months.  - Patient identified as having weight management issue.  Appropriate orders and counseling given.    4. Preventative health care  Ordered labs fasting.  He will be " contacted with the results.  - VITAMIN D,25 HYDROXY; Future  - Comp Metabolic Panel; Future  - Lipid Profile; Future  - HEMOGLOBIN A1C; Future  - CBC WITH DIFFERENTIAL; Future  - TSH WITH REFLEX TO FT4; Future    5. Encounter to establish care         Followup: Return in about 3 months (around 8/21/2021), or if symptoms worsen or fail to improve.    Please note that this dictation was created using voice recognition software. I have made every reasonable attempt to correct obvious errors, but I expect that there are errors of grammar and possibly content that I did not discover before finalizing the note.

## 2021-05-21 NOTE — ASSESSMENT & PLAN NOTE
This is a pleasant 35-year-old male accompanied by his fiancée, Noelle Morales.  He is here today to establish care.  History of elevated blood pressure.  Blood pressure typically runs at home between 130 and 140.  It was high last year at the ER.  At that time he believes it was high because of the circumstances of his medical concerns.  He does have a twin brother who is on blood pressure medication.

## 2021-05-21 NOTE — ASSESSMENT & PLAN NOTE
Chronic condition.  Typically will have reflux symptoms when drinking.  Will take omeprazole when needed approximately 4 times a week.  Has been having symptoms for least 5 years maybe longer.  Does consume alcohol 4 times a week at least 3-10 drinks at a time.  Beer causes bloating so he chooses hard liquor and wine.

## 2021-06-04 ENCOUNTER — HOSPITAL ENCOUNTER (OUTPATIENT)
Dept: LAB | Facility: MEDICAL CENTER | Age: 36
End: 2021-06-04
Attending: PHYSICIAN ASSISTANT
Payer: COMMERCIAL

## 2021-06-04 DIAGNOSIS — Z00.00 PREVENTATIVE HEALTH CARE: ICD-10-CM

## 2021-06-04 LAB
ALBUMIN SERPL BCP-MCNC: 4.4 G/DL (ref 3.2–4.9)
ALBUMIN/GLOB SERPL: 1.5 G/DL
ALP SERPL-CCNC: 83 U/L (ref 30–99)
ALT SERPL-CCNC: 66 U/L (ref 2–50)
ANION GAP SERPL CALC-SCNC: 13 MMOL/L (ref 7–16)
AST SERPL-CCNC: 33 U/L (ref 12–45)
BASOPHILS # BLD AUTO: 0.7 % (ref 0–1.8)
BASOPHILS # BLD: 0.06 K/UL (ref 0–0.12)
BILIRUB SERPL-MCNC: 0.5 MG/DL (ref 0.1–1.5)
BUN SERPL-MCNC: 12 MG/DL (ref 8–22)
CALCIUM SERPL-MCNC: 9.7 MG/DL (ref 8.5–10.5)
CHLORIDE SERPL-SCNC: 102 MMOL/L (ref 96–112)
CHOLEST SERPL-MCNC: 253 MG/DL (ref 100–199)
CO2 SERPL-SCNC: 23 MMOL/L (ref 20–33)
CREAT SERPL-MCNC: 1.09 MG/DL (ref 0.5–1.4)
EOSINOPHIL # BLD AUTO: 0.06 K/UL (ref 0–0.51)
EOSINOPHIL NFR BLD: 0.7 % (ref 0–6.9)
ERYTHROCYTE [DISTWIDTH] IN BLOOD BY AUTOMATED COUNT: 39.3 FL (ref 35.9–50)
EST. AVERAGE GLUCOSE BLD GHB EST-MCNC: 111 MG/DL
FASTING STATUS PATIENT QL REPORTED: NORMAL
GLOBULIN SER CALC-MCNC: 3 G/DL (ref 1.9–3.5)
GLUCOSE SERPL-MCNC: 88 MG/DL (ref 65–99)
HBA1C MFR BLD: 5.5 % (ref 4–5.6)
HCT VFR BLD AUTO: 46.1 % (ref 42–52)
HDLC SERPL-MCNC: 57 MG/DL
HGB BLD-MCNC: 15.3 G/DL (ref 14–18)
IMM GRANULOCYTES # BLD AUTO: 0.03 K/UL (ref 0–0.11)
IMM GRANULOCYTES NFR BLD AUTO: 0.3 % (ref 0–0.9)
LDLC SERPL CALC-MCNC: 164 MG/DL
LYMPHOCYTES # BLD AUTO: 2.11 K/UL (ref 1–4.8)
LYMPHOCYTES NFR BLD: 23.8 % (ref 22–41)
MCH RBC QN AUTO: 29 PG (ref 27–33)
MCHC RBC AUTO-ENTMCNC: 33.2 G/DL (ref 33.7–35.3)
MCV RBC AUTO: 87.3 FL (ref 81.4–97.8)
MONOCYTES # BLD AUTO: 0.6 K/UL (ref 0–0.85)
MONOCYTES NFR BLD AUTO: 6.8 % (ref 0–13.4)
NEUTROPHILS # BLD AUTO: 6.01 K/UL (ref 1.82–7.42)
NEUTROPHILS NFR BLD: 67.7 % (ref 44–72)
NRBC # BLD AUTO: 0 K/UL
NRBC BLD-RTO: 0 /100 WBC
PLATELET # BLD AUTO: 326 K/UL (ref 164–446)
PMV BLD AUTO: 10 FL (ref 9–12.9)
POTASSIUM SERPL-SCNC: 4.5 MMOL/L (ref 3.6–5.5)
PROT SERPL-MCNC: 7.4 G/DL (ref 6–8.2)
RBC # BLD AUTO: 5.28 M/UL (ref 4.7–6.1)
SODIUM SERPL-SCNC: 138 MMOL/L (ref 135–145)
TRIGL SERPL-MCNC: 158 MG/DL (ref 0–149)
TSH SERPL DL<=0.005 MIU/L-ACNC: 0.97 UIU/ML (ref 0.38–5.33)
WBC # BLD AUTO: 8.9 K/UL (ref 4.8–10.8)

## 2021-06-04 PROCEDURE — 80061 LIPID PANEL: CPT

## 2021-06-04 PROCEDURE — 36415 COLL VENOUS BLD VENIPUNCTURE: CPT

## 2021-06-04 PROCEDURE — 85025 COMPLETE CBC W/AUTO DIFF WBC: CPT

## 2021-06-04 PROCEDURE — 84443 ASSAY THYROID STIM HORMONE: CPT

## 2021-06-04 PROCEDURE — 80053 COMPREHEN METABOLIC PANEL: CPT

## 2021-06-04 PROCEDURE — 82306 VITAMIN D 25 HYDROXY: CPT

## 2021-06-04 PROCEDURE — 83036 HEMOGLOBIN GLYCOSYLATED A1C: CPT

## 2021-06-07 PROBLEM — E78.00 PURE HYPERCHOLESTEROLEMIA: Status: ACTIVE | Noted: 2021-06-07

## 2021-06-07 LAB — 25(OH)D3 SERPL-MCNC: 24 NG/ML (ref 30–80)

## 2021-07-15 DIAGNOSIS — I10 ESSENTIAL HYPERTENSION: ICD-10-CM

## 2021-07-15 RX ORDER — LOSARTAN POTASSIUM 50 MG/1
50 TABLET ORAL DAILY
Qty: 30 TABLET | Refills: 3 | Status: SHIPPED | OUTPATIENT
Start: 2021-07-15 | End: 2021-08-23 | Stop reason: SDUPTHER

## 2021-08-09 VITALS — DIASTOLIC BLOOD PRESSURE: 112 MMHG | SYSTOLIC BLOOD PRESSURE: 164 MMHG

## 2021-08-23 ENCOUNTER — OFFICE VISIT (OUTPATIENT)
Dept: MEDICAL GROUP | Facility: MEDICAL CENTER | Age: 36
End: 2021-08-23
Payer: COMMERCIAL

## 2021-08-23 VITALS
TEMPERATURE: 98.3 F | BODY MASS INDEX: 34.34 KG/M2 | HEIGHT: 75 IN | OXYGEN SATURATION: 97 % | WEIGHT: 276.2 LBS | DIASTOLIC BLOOD PRESSURE: 98 MMHG | SYSTOLIC BLOOD PRESSURE: 160 MMHG | HEART RATE: 90 BPM

## 2021-08-23 DIAGNOSIS — I10 ESSENTIAL HYPERTENSION: ICD-10-CM

## 2021-08-23 DIAGNOSIS — E78.00 PURE HYPERCHOLESTEROLEMIA: ICD-10-CM

## 2021-08-23 PROCEDURE — 99214 OFFICE O/P EST MOD 30 MIN: CPT | Performed by: PHYSICIAN ASSISTANT

## 2021-08-23 RX ORDER — LOSARTAN POTASSIUM 100 MG/1
100 TABLET ORAL DAILY
Qty: 30 TABLET | Refills: 3 | Status: SHIPPED | OUTPATIENT
Start: 2021-08-23 | End: 2021-09-10

## 2021-08-23 RX ORDER — LOSARTAN POTASSIUM 100 MG/1
50 TABLET ORAL DAILY
Qty: 30 TABLET | Refills: 3 | Status: SHIPPED | OUTPATIENT
Start: 2021-08-23 | End: 2021-08-23 | Stop reason: SDUPTHER

## 2021-08-23 ASSESSMENT — FIBROSIS 4 INDEX: FIB4 SCORE: 0.46

## 2021-08-23 NOTE — ASSESSMENT & PLAN NOTE
Cholesterol was elevated.  Total cholesterol above 240.  Triglycerides slightly elevated.  HDL at 57 and LDL in the 160s.  Does consume eggs on a daily basis.  Also eats red meat.  Maternal grandfather had a heart attack in his early 60s.  Father side is unknown.

## 2021-08-23 NOTE — PROGRESS NOTES
Subjective:   Adolfo De La Rosa is a 35 y.o. male here today for hypertension and hypercholesterolemia.    Essential hypertension  This is a pleasant 35-year-old male here today to follow-up on his health.  Taking losartan 50 mg daily.  States his blood pressure at home remains elevated.  His fiancée has changed and improve their meals at home.  He is exercising once a week and then does send routine exercises at home.  No weight change from last appointment.  Tells me that his mother has an abdominal aneurysm.  She had surgery recently.  He is unsure about her health history.    Pure hypercholesterolemia  Cholesterol was elevated.  Total cholesterol above 240.  Triglycerides slightly elevated.  HDL at 57 and LDL in the 160s.  Does consume eggs on a daily basis.  Also eats red meat.  Maternal grandfather had a heart attack in his early 60s.  Father side is unknown.       Current medicines (including changes today)  Current Outpatient Medications   Medication Sig Dispense Refill   • Zinc 50 MG Cap Take 50 mg by mouth every day.     • losartan (COZAAR) 100 MG Tab Take 1 Tablet by mouth every day. 30 Tablet 3   • Cholecalciferol (VITAMIN D3) 125 MCG (5000 UT) Cap Take 1 capsule by mouth every day.     • Ascorbic Acid (VITAMIN C) 1000 MG Tab Take 1 tablet by mouth every day.     • B Complex Vitamins (VITAMIN B COMPLEX PO) Take  by mouth.     • Cranberry-Milk Thistle (LIVER & KIDNEY CLEANSER PO) Take  by mouth.     • Omeprazole (PRILOSEC PO) omeprazole     • Multiple Vitamin (MULTI-VITAMIN DAILY PO) Take  by mouth.     • ibuprofen (MOTRIN) 200 MG Tab Take 200 mg by mouth every 6 hours as needed.       No current facility-administered medications for this visit.     He  has a past medical history of GERD (gastroesophageal reflux disease).    Social History and Family History were reviewed and updated.    ROS   No chest pain, no shortness of breath, no abdominal pain and all other systems were reviewed and are  "negative.       Objective:     /98 (BP Location: Left arm, Patient Position: Sitting, BP Cuff Size: Adult)   Pulse 90   Temp 36.8 °C (98.3 °F) (Temporal)   Ht 1.905 m (6' 3\")   Wt (!) 125 kg (276 lb 3.2 oz)   SpO2 97%  Body mass index is 34.52 kg/m².   Physical Exam:  Constitutional: Alert, no distress.  Skin: Warm, dry, good turgor, no rashes in visible areas.  Eye: Equal, round and reactive, conjunctiva clear, lids normal.  ENMT: Lips without lesions, good dentition, oropharynx clear.  Neck: Trachea midline, no masses.   Lymph: No cervical or supraclavicular lymphadenopathy  Respiratory: Unlabored respiratory effort, lungs appear clear, no wheezes.  Cardiovascular: Regular rate rhythm.  Psych: Alert and oriented x3, normal affect and mood.        Assessment and Plan:   The following treatment plan was discussed    1. Essential hypertension  Monitor condition.  Uncontrolled.  Will increase losartan 100 mg daily.  Check blood pressure and contact me through [facility] with next weeks blood pressure values.  Continue to exercise routinely and eat healthy.  Weight loss would be appreciated.  - losartan (COZAAR) 100 MG Tab; Take 1 Tablet by mouth every day.  Dispense: 30 Tablet; Refill: 3    2. Pure hypercholesterolemia  New condition noted in chart but chronic.  Advised to be very strict with fatty food intake.  Check cholesterol profile in 1 month.  If remains elevated will start a statin.  - Lipid Profile; Future         Followup: Return in about 3 months (around 11/23/2021), or if symptoms worsen or fail to improve.    Please note that this dictation was created using voice recognition software. I have made every reasonable attempt to correct obvious errors, but I expect that there are errors of grammar and possibly content that I did not discover before finalizing the note.           "

## 2021-08-23 NOTE — ASSESSMENT & PLAN NOTE
This is a pleasant 35-year-old male here today to follow-up on his health.  Taking losartan 50 mg daily.  States his blood pressure at home remains elevated.  His fiancée has changed and improve their meals at home.  He is exercising once a week and then does send routine exercises at home.  No weight change from last appointment.  Tells me that his mother has an abdominal aneurysm.  She had surgery recently.  He is unsure about her health history.

## 2021-10-18 DIAGNOSIS — I10 ESSENTIAL HYPERTENSION: ICD-10-CM

## 2021-10-18 RX ORDER — LOSARTAN POTASSIUM 50 MG/1
50 TABLET ORAL DAILY
Qty: 90 TABLET | Refills: 1 | Status: SHIPPED | OUTPATIENT
Start: 2021-10-18 | End: 2021-12-22

## 2021-11-29 PROBLEM — U07.1 COVID-19 VIRUS INFECTION: Status: ACTIVE | Noted: 2021-11-29

## 2021-12-22 ENCOUNTER — OFFICE VISIT (OUTPATIENT)
Dept: MEDICAL GROUP | Facility: MEDICAL CENTER | Age: 36
End: 2021-12-22
Payer: COMMERCIAL

## 2021-12-22 VITALS
TEMPERATURE: 97.8 F | WEIGHT: 277.12 LBS | OXYGEN SATURATION: 97 % | BODY MASS INDEX: 34.46 KG/M2 | HEART RATE: 97 BPM | SYSTOLIC BLOOD PRESSURE: 144 MMHG | DIASTOLIC BLOOD PRESSURE: 84 MMHG | HEIGHT: 75 IN

## 2021-12-22 DIAGNOSIS — R51.9 POST-COVID CHRONIC HEADACHE: ICD-10-CM

## 2021-12-22 DIAGNOSIS — U09.9 POST-COVID CHRONIC HEADACHE: ICD-10-CM

## 2021-12-22 DIAGNOSIS — M25.561 ACUTE PAIN OF RIGHT KNEE: ICD-10-CM

## 2021-12-22 DIAGNOSIS — G89.29 POST-COVID CHRONIC HEADACHE: ICD-10-CM

## 2021-12-22 DIAGNOSIS — I10 ESSENTIAL HYPERTENSION: ICD-10-CM

## 2021-12-22 DIAGNOSIS — E78.00 PURE HYPERCHOLESTEROLEMIA: ICD-10-CM

## 2021-12-22 PROBLEM — Z86.16 HISTORY OF COVID-19: Status: ACTIVE | Noted: 2021-11-29

## 2021-12-22 PROCEDURE — 99214 OFFICE O/P EST MOD 30 MIN: CPT | Performed by: PHYSICIAN ASSISTANT

## 2021-12-22 RX ORDER — HYDROCHLOROTHIAZIDE 12.5 MG/1
12.5 CAPSULE, GELATIN COATED ORAL DAILY
Qty: 90 CAPSULE | Refills: 1 | Status: SHIPPED | OUTPATIENT
Start: 2021-12-22 | End: 2022-06-21

## 2021-12-22 RX ORDER — LOSARTAN POTASSIUM 100 MG/1
100 TABLET ORAL DAILY
Qty: 90 TABLET | Refills: 1 | Status: SHIPPED | OUTPATIENT
Start: 2021-12-22 | End: 2022-03-17

## 2021-12-22 ASSESSMENT — FIBROSIS 4 INDEX: FIB4 SCORE: 0.47

## 2021-12-22 NOTE — ASSESSMENT & PLAN NOTE
He moved in October.  During the move he injured his right knee.  Complains of generalized pain around the knee.  Typically on both sides.  Some swelling.  Symptoms worse with walking as well as in the morning.  Denies any improvement taking over-the-counter nonsteroidals.

## 2021-12-22 NOTE — ASSESSMENT & PLAN NOTE
This is a pleasant 36-year-old male who contracted Covid last month.  He did well but still has a persistent headache.  Typically will occur twice a week.  Located around bilateral temples.  Usually occur in the evenings.  Change in vision.  Denies any radiation of pain.  No nausea or vomiting.  Headache will last a couple hours.  He will take over-the-counter nonsteroidals as needed.

## 2021-12-22 NOTE — PROGRESS NOTES
Subjective:   Adolfo De La Rosa is a 36 y.o. male here today for post Covid headache, hypertension and acute pain of the right knee.    Post-COVID chronic headache  This is a pleasant 36-year-old male who contracted Covid last month.  He did well but still has a persistent headache.  Typically will occur twice a week.  Located around bilateral temples.  Usually occur in the evenings.  Change in vision.  Denies any radiation of pain.  No nausea or vomiting.  Headache will last a couple hours.  He will take over-the-counter nonsteroidals as needed.    Essential hypertension  Chronic condition.  Blood pressure appears elevated today.  Takes losartan 100 mg daily.  Blood pressure typically in the morning runs almost at 140 with systolic value.  Hasn't checked it over the past few weeks.    Acute pain of right knee  He moved in October.  During the move he injured his right knee.  Complains of generalized pain around the knee.  Typically on both sides.  Some swelling.  Symptoms worse with walking as well as in the morning.  Denies any improvement taking over-the-counter nonsteroidals.       Current medicines (including changes today)  Current Outpatient Medications   Medication Sig Dispense Refill   • losartan (COZAAR) 100 MG Tab Take 1 Tablet by mouth every day. 90 Tablet 1   • hydrochlorothiazide (MICROZIDE) 12.5 MG capsule Take 1 Capsule by mouth every day. 90 Capsule 1   • Zinc 50 MG Cap Take 50 mg by mouth every day.     • Cholecalciferol (VITAMIN D3) 125 MCG (5000 UT) Cap Take 1 capsule by mouth every day.     • Ascorbic Acid (VITAMIN C) 1000 MG Tab Take 1 tablet by mouth every day.     • B Complex Vitamins (VITAMIN B COMPLEX PO) Take  by mouth.     • Cranberry-Milk Thistle (LIVER & KIDNEY CLEANSER PO) Take  by mouth.     • Omeprazole (PRILOSEC PO) omeprazole     • Multiple Vitamin (MULTI-VITAMIN DAILY PO) Take  by mouth.     • ibuprofen (MOTRIN) 200 MG Tab Take 200 mg by mouth every 6 hours as needed.       No  "current facility-administered medications for this visit.     He  has a past medical history of GERD (gastroesophageal reflux disease).    Social History and Family History were reviewed and updated.    ROS   No chest pain, no shortness of breath, no abdominal pain and all other systems were reviewed and are negative.       Objective:     /84 (BP Location: Left arm, Patient Position: Sitting, BP Cuff Size: Adult)   Pulse 97   Temp 36.6 °C (97.8 °F) (Temporal)   Ht 1.905 m (6' 3\")   Wt (!) 126 kg (277 lb 1.9 oz)   SpO2 97%  Body mass index is 34.64 kg/m².   Physical Exam:  Constitutional: Alert, no distress.  Skin: Warm, dry, good turgor, no rashes in visible areas.  Eye: Equal, round and reactive, conjunctiva clear, lids normal.  ENMT: Lips without lesions, good dentition, oropharynx clear.  Neck: Trachea midline, no masses.   Lymph: No cervical or supraclavicular lymphadenopathy  Respiratory: Unlabored respiratory effort, lungs appear clear, no wheezes.  Cardiovascular: Regular rate and rhythm.  Psych: Alert and oriented x3, normal affect and mood.        Assessment and Plan:   The following treatment plan was discussed    1. Post-COVID headache  Acute, new onset condition.  Hopefully the headache will eventually resolve.  Would follow-up with ophthalmology or optometry to get an eye examination.  Take ibuprofen 800 mg as needed.  We'll continue to monitor.    2. Acute pain of right knee  Acute, new onset condition.  Appears to be a strain.  Refer to orthopedics for evaluation and treatment.  - Referral to Orthopedics    3. Essential hypertension  Chronic condition.  Uncontrolled.  Renewed losartan but will add hydrochlorothiazide 12.5 mg.  Follow-up with labs in about 1 month.  Advised to check BP in the morning for the next week.  If systolic ranges still above 120 he is to contact me about increasing dose to 25 mg.  - losartan (COZAAR) 100 MG Tab; Take 1 Tablet by mouth every day.  Dispense: 90 " Tablet; Refill: 1  - hydrochlorothiazide (MICROZIDE) 12.5 MG capsule; Take 1 Capsule by mouth every day.  Dispense: 90 Capsule; Refill: 1  - Basic Metabolic Panel; Future    4. Pure hypercholesterolemia  Chronic condition.  Soon he will be exercising more routinely.  Will check cholesterol profile 1 month.  Fast 8 hours.  - Lipid Profile; Future         Followup: Return in about 2 months (around 2/22/2022), or if symptoms worsen or fail to improve.    Please note that this dictation was created using voice recognition software. I have made every reasonable attempt to correct obvious errors, but I expect that there are errors of grammar and possibly content that I did not discover before finalizing the note.

## 2022-02-18 ENCOUNTER — OFFICE VISIT (OUTPATIENT)
Dept: MEDICAL GROUP | Facility: MEDICAL CENTER | Age: 37
End: 2022-02-18
Payer: COMMERCIAL

## 2022-02-18 VITALS
HEART RATE: 100 BPM | DIASTOLIC BLOOD PRESSURE: 88 MMHG | WEIGHT: 278.66 LBS | OXYGEN SATURATION: 97 % | SYSTOLIC BLOOD PRESSURE: 136 MMHG | HEIGHT: 75 IN | BODY MASS INDEX: 34.65 KG/M2 | TEMPERATURE: 97.7 F

## 2022-02-18 DIAGNOSIS — G89.29 CHRONIC PAIN OF RIGHT KNEE: ICD-10-CM

## 2022-02-18 DIAGNOSIS — M25.561 CHRONIC PAIN OF RIGHT KNEE: ICD-10-CM

## 2022-02-18 DIAGNOSIS — R06.81 WITNESSED APNEIC SPELLS: ICD-10-CM

## 2022-02-18 PROBLEM — G47.30 SLEEP APNEA: Status: ACTIVE | Noted: 2022-02-18

## 2022-02-18 PROCEDURE — 99214 OFFICE O/P EST MOD 30 MIN: CPT | Performed by: PHYSICIAN ASSISTANT

## 2022-02-18 ASSESSMENT — PATIENT HEALTH QUESTIONNAIRE - PHQ9
SUM OF ALL RESPONSES TO PHQ QUESTIONS 1-9: 5
5. POOR APPETITE OR OVEREATING: 1 - SEVERAL DAYS
CLINICAL INTERPRETATION OF PHQ2 SCORE: 1

## 2022-02-18 ASSESSMENT — FIBROSIS 4 INDEX: FIB4 SCORE: 0.47

## 2022-02-18 NOTE — ASSESSMENT & PLAN NOTE
States he will wake up multiple times at nighttime.  Typically he will watch TV in bed before falling back asleep but it may take hours.  Sometimes he wakes up gasping.  Other times he wakes up because he hears noises.  He states that he believes he is getting used to the new home.

## 2022-02-18 NOTE — PROGRESS NOTES
Subjective:   Adolfo De La Rosa is a 36 y.o. male here today for chronic right knee pain and witnessed apneic spells.    Chronic pain of right knee  This is a pleasant 36-year-old male accompanied by his wife.  He still has right knee pain.  Knee pops a lot.  There is generalized pain most of the day.  Symptoms began after a move in October.  I referred him to [facility] but he has been unable to make an appointment.  He leaves messages.  He denies that his knee floyd.    Witnessed apneic spells  States he will wake up multiple times at nighttime.  Typically he will watch TV in bed before falling back asleep but it may take hours.  Sometimes he wakes up gasping.  Other times he wakes up because he hears noises.  He states that he believes he is getting used to the new home.       Current medicines (including changes today)  Current Outpatient Medications   Medication Sig Dispense Refill   • losartan (COZAAR) 100 MG Tab Take 1 Tablet by mouth every day. 90 Tablet 1   • hydrochlorothiazide (MICROZIDE) 12.5 MG capsule Take 1 Capsule by mouth every day. 90 Capsule 1   • Zinc 50 MG Cap Take 50 mg by mouth every day.     • Cholecalciferol (VITAMIN D3) 125 MCG (5000 UT) Cap Take 1 capsule by mouth every day.     • Ascorbic Acid (VITAMIN C) 1000 MG Tab Take 1 tablet by mouth every day.     • B Complex Vitamins (VITAMIN B COMPLEX PO) Take  by mouth.     • Cranberry-Milk Thistle (LIVER & KIDNEY CLEANSER PO) Take  by mouth.     • Omeprazole (PRILOSEC PO) omeprazole     • Multiple Vitamin (MULTI-VITAMIN DAILY PO) Take  by mouth.     • ibuprofen (MOTRIN) 200 MG Tab Take 200 mg by mouth every 6 hours as needed.       No current facility-administered medications for this visit.     He  has a past medical history of GERD (gastroesophageal reflux disease).    Social History and Family History were reviewed and updated.    ROS   No chest pain, no shortness of breath, no abdominal pain and all other systems were reviewed and are  "negative.       Objective:     /88 (BP Location: Left arm, Patient Position: Sitting, BP Cuff Size: Adult)   Pulse 100   Temp 36.5 °C (97.7 °F) (Temporal)   Ht 1.905 m (6' 3\")   Wt (!) 126 kg (278 lb 10.6 oz)   SpO2 97%  Body mass index is 34.83 kg/m².   Physical Exam:  Constitutional: Alert, no distress.  Skin: Warm, dry, good turgor, no rashes in visible areas.  Eye: Equal, round and reactive, conjunctiva clear, lids normal.  ENMT: Lips without lesions, good dentition, oropharynx clear.  Neck: Trachea midline, no masses.   Lymph: No cervical or supraclavicular lymphadenopathy  Respiratory: Unlabored respiratory effort, lungs appear clear, no wheezes.  Cardiovascular: Regular rate and rhythm.  Psych: Alert and oriented x3, normal affect and mood.        Assessment and Plan:   The following treatment plan was discussed    1. Chronic pain of right knee  Chronic condition.  Deferred imaging.  He may need an MRI.  I contacted our fracture directly regarding referral.  Like him to see Dr. Phan.  - Referral to Orthopedics    2. Witnessed apneic spells  Chronic condition.  But new condition noted in chart.  Likely sleep apnea.  Discussed need for sleep study.  Discussed risk associated with sleep apnea given that he is already hypertensive.  - Referral to Pulmonary and Sleep Medicine         Followup: Return in about 6 months (around 8/18/2022), or if symptoms worsen or fail to improve.    Please note that this dictation was created using voice recognition software. I have made every reasonable attempt to correct obvious errors, but I expect that there are errors of grammar and possibly content that I did not discover before finalizing the note.           "

## 2022-02-18 NOTE — ASSESSMENT & PLAN NOTE
This is a pleasant 36-year-old male accompanied by his wife.  He still has right knee pain.  Knee pops a lot.  There is generalized pain most of the day.  Symptoms began after a move in October.  I referred him to [facility] but he has been unable to make an appointment.  He leaves messages.  He denies that his knee floyd.

## 2022-03-17 DIAGNOSIS — I10 ESSENTIAL HYPERTENSION: ICD-10-CM

## 2022-03-17 RX ORDER — LOSARTAN POTASSIUM 100 MG/1
100 TABLET ORAL DAILY
Qty: 90 TABLET | Refills: 1 | Status: SHIPPED | OUTPATIENT
Start: 2022-03-17 | End: 2022-06-29 | Stop reason: SDUPTHER

## 2022-05-08 ASSESSMENT — ENCOUNTER SYMPTOMS: SLEEP DISTURBANCE: 1

## 2022-05-11 ENCOUNTER — OFFICE VISIT (OUTPATIENT)
Dept: SLEEP MEDICINE | Facility: MEDICAL CENTER | Age: 37
End: 2022-05-11
Payer: COMMERCIAL

## 2022-05-11 VITALS
BODY MASS INDEX: 34.82 KG/M2 | RESPIRATION RATE: 16 BRPM | HEART RATE: 85 BPM | DIASTOLIC BLOOD PRESSURE: 88 MMHG | WEIGHT: 280 LBS | SYSTOLIC BLOOD PRESSURE: 138 MMHG | HEIGHT: 75 IN | OXYGEN SATURATION: 94 %

## 2022-05-11 DIAGNOSIS — G47.19 EXCESSIVE DAYTIME SLEEPINESS: ICD-10-CM

## 2022-05-11 DIAGNOSIS — G47.30 SLEEP DISORDER BREATHING: Primary | ICD-10-CM

## 2022-05-11 PROCEDURE — 99204 OFFICE O/P NEW MOD 45 MIN: CPT | Performed by: STUDENT IN AN ORGANIZED HEALTH CARE EDUCATION/TRAINING PROGRAM

## 2022-05-11 ASSESSMENT — FIBROSIS 4 INDEX: FIB4 SCORE: 0.47

## 2022-05-11 NOTE — PROGRESS NOTES
[facility] Consult Note     Date: 5/11/2022 / Time: 9:51 AM      Thank you for requesting a sleep medicine consultation on Adolfo De La Rosa at the sleep center. Presents today with the chief complaints of snoring, excessive daytime sleepiness, unrefreshing sleep. He is referred by Satya Bullard P.A.-C.  [address]  [address]  [place] [address] for evaluation and treatment of sleep disorder breathing .     HISTORY OF PRESENT ILLNESS:     Adolfo De La Rosa is a 36 y.o. male with GERD, hypertension, obesity and snoring.  Presents to Sleep Clinic for evaluation of potential sleep apnea.    He does have an identical twin brother who has sleep apnea and he believes is on CPAP.    What led him to today's appointment is that he wakes up multiple times in the night and feels he is not getting enough sleep.  He is consistently sleepy and he will often wake up gasping for air.  His partners told him that he does pause in breathing during sleep.    He consistently wakes up feeling unrefreshed.  He is tired at times during the day.  He is napping 3 days a week around lunchtime for 20 to 40 minutes which she finds refreshing.  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  He is aware to avoid driving if drowsy.  States he does not feel unsafe driving.  He has not fallen asleep while driving.    As per supplemental questionnaire to be scanned or imported into chart:    [place] Sleepiness Score: 12    Sleep Schedule  Bedtime: Weekday 11pm Weekend 11-12am  Wake time: Weekday 730am Weekend 8-9am  Sleep-onset latency: 20 min   Awakenings from sleep: 3  Difficulty falling back asleep: sometimes, 2 nights a week   Bedroom partner: yes  Naps: Yes, after lunch falls asleep 20-40min, 3 days a week. Finds refreshign.     DAYTIME SYMPTOMS:   Excessive daytime sleepiness: Yes  Daytime fatigue: Yes  Difficulty concentrating: Yes  Memory problems: No   Irritability:Yes  Work/school  "performance issues: No   Sleepiness with driving: Yes, few times a week.   Caffeine/stimulant use: Yes 2 cups   Alcohol use:Yes, How Many? 20 drinks a week.      SLEEP RELATED SYMPTOMS  Snoring: Yes  Witnessed apnea or gasping/choking: Yes  Dry mouth or mouth breathing: Yes  Sweating: Yes  Teeth grinding/biting: No   Morning headaches: Yes  Refreshed Upon Awakening: No , rested sometimes     SLEEP RELATED BEHAVIORS:  Parasomnias (walking, talking, eating, violence): No   Leg kicking: Yes  Restless legs - \"urge to move\": No   Nightmares: No  Recurrent: No   Dream enactment: No      NARCOLEPSY:  Cataplexy: No   Sleep paralysis: sometimes, about once a month  Sleep attacks: No   Hypnagogic/hypnopompic hallucinations: Yes, can see someone in room.     MEDICAL HISTORY  Past Medical History:   Diagnosis Date   • Abdominal pain    • Apnea, sleep    • Back pain    • Back pain    • Chest tightness    • Chickenpox    • Daytime sleepiness    • Diarrhea    • Fatigue    • Frequent urination    • Gasping for breath    • GERD (gastroesophageal reflux disease)    • Hearing difficulty    • Heartburn    • Hypertension    • Insomnia    • Morning headache    • Painful joint    • Snoring    • Sore muscles    • Sweat, sweating, excessive    • Tonsillitis         SURGICAL HISTORY  Past Surgical History:   Procedure Laterality Date   • HERNIA REPAIR          FAMILY HISTORY  Family History   Problem Relation Age of Onset   • Arterial Aneurysm Mother         Abdominal   • Hypertension Brother    • Sleep Apnea Brother    • Stroke Maternal Grandfather         Heart attack       SOCIAL HISTORY  Social History     Socioeconomic History   • Marital status: Other   Tobacco Use   • Smoking status: Never Smoker   • Smokeless tobacco: Never Used   Vaping Use   • Vaping Use: Never used   Substance and Sexual Activity   • Alcohol use: Yes     Alcohol/week: 16.8 oz     Types: 2 Glasses of wine, 12 Shots of liquor, 14 Standard drinks or equivalent per " "week     Comment: 3-10 drinks daily about 4 times weekly   • Drug use: Yes     Frequency: 4.0 times per week     Types: Marijuana, Oral     Comment: Edibles        Occupation:  for [employer]    CURRENT MEDICATIONS  Current Outpatient Medications   Medication Sig Dispense Refill   • losartan (COZAAR) 100 MG Tab TAKE 1 TABLET BY MOUTH EVERY DAY 90 Tablet 1   • hydrochlorothiazide (MICROZIDE) 12.5 MG capsule Take 1 Capsule by mouth every day. 90 Capsule 1   • Zinc 50 MG Cap Take 50 mg by mouth every day.     • Cholecalciferol (VITAMIN D3) 125 MCG (5000 UT) Cap Take 1 capsule by mouth every day.     • Ascorbic Acid (VITAMIN C) 1000 MG Tab Take 1 tablet by mouth every day.     • B Complex Vitamins (VITAMIN B COMPLEX PO) Take  by mouth.     • Cranberry-Milk Thistle (LIVER & KIDNEY CLEANSER PO) Take  by mouth.     • Omeprazole (PRILOSEC PO) omeprazole     • Multiple Vitamin (MULTI-VITAMIN DAILY PO) Take  by mouth.     • ibuprofen (MOTRIN) 200 MG Tab Take 200 mg by mouth every 6 hours as needed.       No current facility-administered medications for this visit.       REVIEW OF SYSTEMS  Constitutional: Denies fevers, Denies weight changes  Ears/Nose/Throat/Mouth: Denies nasal congestion or sore throat   Cardiovascular: Denies chest pain  Respiratory: Denies shortness of breath, Denies cough  Gastrointestinal/Hepatic: Denies nausea, vomiting  Sleep: see HPI    Physical Examination:  Vitals/ General Appearance:   Weight/BMI: Body mass index is 35 kg/m².  /88 (BP Location: Left arm, Patient Position: Sitting, BP Cuff Size: Adult)   Pulse 85   Resp 16   Ht 1.905 m (6' 3\")   Wt (!) 127 kg (280 lb)   SpO2 94%   Vitals:    05/11/22 0937   BP: 138/88   BP Location: Left arm   Patient Position: Sitting   BP Cuff Size: Adult   Pulse: 85   Resp: 16   SpO2: 94%   Weight: (!) 127 kg (280 lb)   Height: 1.905 m (6' 3\")       Pt. is alert and oriented to time, place and person. Cooperative and in no " apparent distress.     Constitutional: Alert, no distress, well-groomed.  Skin: No rashes in visible areas.  Eye: Round. Conjunctiva clear, lids normal. No icterus.   ENT EXAM  Nasal alae/valves collapsible: No   Nasal septum deviation: Yes  Nasal turbinate hypertrophy: Left: Grade 1   Right: Grade 1  Hard palate narrow: Yes  Hard palate high: Yes  Soft palate/uvula (Mallampati score): 4  Tongue Scalloping: Yes  Retrognathia: No   Micrognathia: No   Cardiovascular:no murmus/gallops/rubs, normal S1 and S2 heart sounds, regular rate and rhythm  Pulmonary:Clear to auscultation, No wheezes, No crackles.  Neurologic:Awake, alert and oriented x 3, Normal age appropriate gait, No involuntary motions.  Extremities: No clubbing, cyanosis, or edema       ASSESSMENT AND PLAN   Adolfo De La Rosa is a 36 y.o. male with GERD, hypertension, obesity and snoring.  Presents to Sleep Clinic for evaluation of potential sleep apnea.    1. Adolfo De La Rosa  has symptoms of Obstructive Sleep Apnea (MILLY). Adolfo De La Rosa has symptoms of snoring, choking/gasping during sleep, witnessed apnea, dry mouth, morning headaches, unrefreshed upon awakening, daytime sleepiness. These  interfere with activities of daily living.   ESS 12  Pt has risk factors for MILLY include obesity, family history, and crowded oropharynx.     The pathophysiology of MILLY and the increased risk of cardiovascular morbidity from untreated MILLY is discussed in detail with the patient. He  also has HTN, GERD which can be worsened by MILLY.      We have discussed diagnostic options including in-laboratory, attended polysomnography and home sleep testing. We have also discussed treatment options including airway pressurization, reconstructive otolaryngologic surgery, dental appliances and weight management.     Subsequently,treatment options will be discussed based on the diagnostic study. Meanwhile, He is urged to avoid supine sleep, weight gain and alcoholic beverages  since all of these can worsen MILLY.     He is cautioned against drowsy driving. If He feels sleepy while driving, advised must pull over for a break/nap, rather than persist on the road, in the interest of Pt's own safety and that of others on the road.    Encouraged to decrease alcohol usage.    Discussed sleep paralysis and hypnopompic hallucinations potentially could be due to sleep deprivation or untreated obstructive sleep apnea.    Plan  -  He  will be scheduled for an overnight HST to assess sleep related breathing disorder.  - Follow up 1-2 weeks after sleep study to discuss results and treatment options moving forward   -Advised to reach out via [facility] or by phone with any questions or concerns.     2.  Regarding treatment of other past medical problems and general health maintenance,  Pt is urged to follow up with PCP.      Please note portions of this record was created using voice recognition software. I have made every reasonable attempt to correct obvious errors, but I expect that there are errors of grammar and possibly content I did not discover before finalizing the note.

## 2022-06-24 ENCOUNTER — HOME STUDY (OUTPATIENT)
Dept: SLEEP MEDICINE | Facility: MEDICAL CENTER | Age: 37
End: 2022-06-24
Attending: STUDENT IN AN ORGANIZED HEALTH CARE EDUCATION/TRAINING PROGRAM
Payer: COMMERCIAL

## 2022-06-24 DIAGNOSIS — G47.30 SLEEP DISORDER BREATHING: ICD-10-CM

## 2022-06-24 DIAGNOSIS — G47.19 EXCESSIVE DAYTIME SLEEPINESS: ICD-10-CM

## 2022-06-26 PROCEDURE — 95806 SLEEP STUDY UNATT&RESP EFFT: CPT | Performed by: STUDENT IN AN ORGANIZED HEALTH CARE EDUCATION/TRAINING PROGRAM

## 2022-06-29 DIAGNOSIS — I10 ESSENTIAL HYPERTENSION: ICD-10-CM

## 2022-06-29 NOTE — PROCEDURES
DIAGNOSTIC HOME SLEEP TEST (HST) REPORT       PATIENT ID:  NAME:  Adolfo De La Rosa  MRN:               [id number]  YOB: 1985  DATE OF STUDY: 6/26/2022      Impression:     This study shows evidence of:     1. Severe obstructive sleep apnea with  Respiratory Event Index (LINDA) of 56.8 per hour and worse in supine sleep with LINDA at 54.5. These findings are based on the recording time (flow evaluation time). It is not possible with this device to determine a traditional apnea+hypopnea index (AHI) for total sleep time since EEG channels are not available.     2. Oxygenation O2 Sat. rakesh was 73% and mean O2 sat was 93% and baseline O2 at 96 %. O2 sat was below 88% for 8 min of the flow evaluation time. Oxygen Desaturation (>=3%) Index was elevated at 56.2/hr. AVG HR was 78 BPM.      TECHNICAL DESCRIPTION: Elixent apnea link air device used was a type-III home study device. Home sleep study recording included: Airflow recording by nasal pressure transducer; Respiratory Effort by 1 RIP Band; Oxygen Saturation and heart rate by finger oximetry. A position sensor and a snore channel was also used.    Scoring Criteria: A modification of the the AASM Manual for the Scoring of Sleep and Associated Events, 2012, was used.   Obstructive apnea was scored by cessation of airflow for at least 10 seconds with continuing respiratory effort.  Central apnea was scored by cessation of airflow for at least 10 seconds with no effort.  Hypopnea was scored by a 30% or more reduction in airflow for at least 10 seconds accompanied by an arterial oxygen desaturation of 3% or more.  (For Medicare patients, hypopneas were scored by a 30% or more reduction in airflow for at least 10 seconds accompanied by an arterial oxygen saturation of 4% or more, as required by their insurance, CMS.        General sleep summary: . Total recording time is 3 hours and 51 minutes and total flow evaluation time is 3 hours and 39 minutes. The  patient spent 3 hours and 21 minutes in the supine position and 0 hours and 12 minutes in the nonsupine position.    Respiratory events:    Apneas: Total 48 (Obstructive apnea index 12.6/hr, Central apnea index 0.5 /hr, mixed 0 /hour)  Hypopneas: Total 160 with a Hypopnea index of 43.7 /hr    Recommendations:    1. CPAP titration study vs Auto CPAP trial .   2.   In general patients with sleep apnea are advised to avoid alcohol and sedatives and to not operate a motor vehicle while drowsy. In some cases alternative treatment options may prove effective in resolving sleep apnea in these options include upper airway surgery, the use of a dental orthotic or weight loss and positional therapy. Clinical correlation is required.         Moe Mathews MD

## 2022-06-30 RX ORDER — HYDROCHLOROTHIAZIDE 12.5 MG/1
12.5 CAPSULE, GELATIN COATED ORAL DAILY
Qty: 90 CAPSULE | Refills: 1 | Status: SHIPPED | OUTPATIENT
Start: 2022-06-30 | End: 2022-12-20

## 2022-06-30 RX ORDER — LOSARTAN POTASSIUM 100 MG/1
100 TABLET ORAL DAILY
Qty: 90 TABLET | Refills: 1 | Status: SHIPPED | OUTPATIENT
Start: 2022-06-30 | End: 2022-12-29

## 2022-07-15 ENCOUNTER — OFFICE VISIT (OUTPATIENT)
Dept: SLEEP MEDICINE | Facility: MEDICAL CENTER | Age: 37
End: 2022-07-15
Payer: COMMERCIAL

## 2022-07-15 VITALS
SYSTOLIC BLOOD PRESSURE: 140 MMHG | DIASTOLIC BLOOD PRESSURE: 94 MMHG | RESPIRATION RATE: 14 BRPM | BODY MASS INDEX: 34.69 KG/M2 | WEIGHT: 279 LBS | HEIGHT: 75 IN | HEART RATE: 68 BPM | OXYGEN SATURATION: 96 %

## 2022-07-15 DIAGNOSIS — G47.33 OSA (OBSTRUCTIVE SLEEP APNEA): Primary | ICD-10-CM

## 2022-07-15 PROCEDURE — 99213 OFFICE O/P EST LOW 20 MIN: CPT | Performed by: STUDENT IN AN ORGANIZED HEALTH CARE EDUCATION/TRAINING PROGRAM

## 2022-07-15 ASSESSMENT — FIBROSIS 4 INDEX: FIB4 SCORE: 0.47

## 2022-07-15 NOTE — PROGRESS NOTES
[facility] Follow-up Visit    CC: Follow-up to discuss sleep study results    HPI:  Adolfo De La Rosa is a 36 y.o.male  with GERD, hypertension, obesity, snoring.  Presents today to discuss sleep study results.    He reports the night of the sleep study was a difficult night.  States at the time he had significant sinus congestion and nasal congestion.  Due to this he only wore the recording device for approximately 3 and half hours.  He is concerned about the validity of the study results given the severity of sleep apnea seen.  He is concerned that his acute illness at the time of the study may have skewed his results.    At last visit his main concern was waking up multiple times at night and having trouble getting enough sleep.  He would wake up gasping for air and has been told by partners that he has pauses in breathing.    He does have a family history of sleep apnea with an identical twin brother who has sleep apnea.    Patient Active Problem List    Diagnosis Date Noted   • Witnessed apneic spells 02/18/2022   • Post-COVID chronic headache 12/22/2021   • Chronic pain of right knee 12/22/2021   • History of COVID-19 11/29/2021   • Pure hypercholesterolemia 06/07/2021   • Obesity (BMI 30-39.9) 05/21/2021   • Essential hypertension 05/21/2021   • Gastroesophageal reflux disease without esophagitis 05/21/2021       Past Medical History:   Diagnosis Date   • Abdominal pain    • Apnea, sleep    • Back pain    • Back pain    • Chest tightness    • Chickenpox    • Daytime sleepiness    • Diarrhea    • Fatigue    • Frequent urination    • Gasping for breath    • GERD (gastroesophageal reflux disease)    • Hearing difficulty    • Heartburn    • Hypertension    • Insomnia    • Morning headache    • Painful joint    • Snoring    • Sore muscles    • Sweat, sweating, excessive    • Tonsillitis         Past Surgical History:   Procedure Laterality Date   • HERNIA REPAIR         Family History   Problem Relation  Age of Onset   • Arterial Aneurysm Mother         Abdominal   • Hypertension Brother    • Sleep Apnea Brother    • Stroke Maternal Grandfather         Heart attack       Social History     Socioeconomic History   • Marital status: Other     Spouse name: Not on file   • Number of children: Not on file   • Years of education: Not on file   • Highest education level: Not on file   Occupational History   • Not on file   Tobacco Use   • Smoking status: Never Smoker   • Smokeless tobacco: Never Used   Vaping Use   • Vaping Use: Never used   Substance and Sexual Activity   • Alcohol use: Yes     Alcohol/week: 16.8 oz     Types: 2 Glasses of wine, 12 Shots of liquor, 14 Standard drinks or equivalent per week     Comment: 3-10 drinks daily about 4 times weekly   • Drug use: Yes     Frequency: 4.0 times per week     Types: Marijuana, Oral     Comment: Edibles   • Sexual activity: Not on file     Comment: Scar (Noelle Morales)   Other Topics Concern   • Not on file   Social History Narrative   • Not on file     Social Determinants of Health     Financial Resource Strain: Not on file   Food Insecurity: Not on file   Transportation Needs: Not on file   Physical Activity: Not on file   Stress: Not on file   Social Connections: Not on file   Intimate Partner Violence: Not on file   Housing Stability: Not on file       Current Outpatient Medications   Medication Sig Dispense Refill   • losartan (COZAAR) 100 MG Tab Take 1 Tablet by mouth every day. 90 Tablet 1   • hydrochlorothiazide (MICROZIDE) 12.5 MG capsule Take 1 Capsule by mouth every day. 90 Capsule 1   • Zinc 50 MG Cap Take 50 mg by mouth every day.     • Cholecalciferol (VITAMIN D3) 125 MCG (5000 UT) Cap Take 1 capsule by mouth every day.     • Ascorbic Acid (VITAMIN C) 1000 MG Tab Take 1 tablet by mouth every day.     • B Complex Vitamins (VITAMIN B COMPLEX PO) Take  by mouth.     • Cranberry-Milk Thistle (LIVER & KIDNEY CLEANSER PO) Take  by mouth.     • Omeprazole  "(PRILOSEC PO) omeprazole     • Multiple Vitamin (MULTI-VITAMIN DAILY PO) Take  by mouth.     • ibuprofen (MOTRIN) 200 MG Tab Take 200 mg by mouth every 6 hours as needed.       No current facility-administered medications for this visit.        ALLERGIES: Patient has no known allergies.    ROS  Constitutional: Denies fevers, Denies weight changes  Ears/Nose/Throat/Mouth: Denies nasal congestion or sore throat   Cardiovascular: Denies chest pain  Respiratory: Denies shortness of breath, Denies cough  Gastrointestinal/Hepatic: Denies nausea, vomiting  Sleep: see HPI      PHYSICAL EXAM  BP (!) 140/94 (BP Location: Left arm, Patient Position: Sitting, BP Cuff Size: Adult)   Pulse 68   Resp 14   Ht 1.905 m (6' 3\")   Wt (!) 127 kg (279 lb)   SpO2 96%   BMI 34.87 kg/m²   Appearance: Well-nourished, well-developed, no acute distress  Eyes:  No scleral icterus , EOMI  ENMT: masked  Musculoskeletal:  Grossly normal; gait and station normal; digits and nails normal  Skin:  No rashes, petechiae, cyanosis  Neurologic: without focal signs; oriented to person, time, place, and purpose; judgement intact      Medical Decision Making   Assessment and Plan  Adolfo De La Rosa is a 36 y.o.male  with GERD, hypertension, obesity, snoring.  Presents today to discuss sleep study results.    Obstructive sleep apnea   Reviewed recent HST with patient showing an AHI of 56.8, CHELSEA 56.2 and Min Oxygen saturation of 73%.  Time at or below 88% saturation 8 minutes.  Monitoring time was 3 hours 39 minutes.  Based on sleep study and symptoms meets criteria for severe obstructive sleep apnea.   We discussed the pathophysiology of obstructive sleep apnea (MILLY) and risk factors for the disease. We also discussed possible consequences of untreated MILLY, including excessive daytime sleepiness and fatigue, cognitive dysfunction, cardiovascular complications such as elevated blood pressure, heart attacks, cardiac arrhythmias, and strokes. We discussed " how MILLY typically gets worse with age. We discussed treatment options for MILLY, including the gold standard therapy (PAP), alternative options such as a mandibular advancement device (custom-made oral appliances) and surgeries.     Patient request to repeat home sleep study given his acute illness at the time of study.  There is a potential that given his nasal congestion the flow sensor may have had a hard time reading airflow.  Discussed that this should not skew the CHELSEA results as well as oxygen desaturation.  However given patient's concern we will schedule for repeat home testing.    RECOMMENDATIONS  -Will be scheduled for HST  -Patient counseled to avoid driving when sleepy. Encouraged to anticipate sleepiness, consider taking a 10 min nap prior to driving, alternate with another , or pull over if sleepy while driving  -Advised to contact our office or myself with any questions via [facility]    Positive airway pressure will favorably impact many of the adverse conditions and effects provoked by MILLY.    Have advised the patient to follow up with the appropriate healthcare practitioners for all other medical problems and issues.    Return for After sleep study.      Please note portions of this record was created using voice recognition software. I have made every reasonable attempt to correct obvious errors, but I expect that there are errors of grammar and possibly content I did not discover before finalizing the note.

## 2022-07-18 ENCOUNTER — OFFICE VISIT (OUTPATIENT)
Dept: MEDICAL GROUP | Facility: MEDICAL CENTER | Age: 37
End: 2022-07-18
Payer: COMMERCIAL

## 2022-07-18 VITALS
DIASTOLIC BLOOD PRESSURE: 70 MMHG | HEIGHT: 75 IN | SYSTOLIC BLOOD PRESSURE: 138 MMHG | WEIGHT: 277.2 LBS | HEART RATE: 100 BPM | OXYGEN SATURATION: 97 % | TEMPERATURE: 96.5 F | BODY MASS INDEX: 34.47 KG/M2

## 2022-07-18 DIAGNOSIS — G89.29 CHRONIC PAIN OF RIGHT KNEE: ICD-10-CM

## 2022-07-18 DIAGNOSIS — M25.561 CHRONIC PAIN OF RIGHT KNEE: ICD-10-CM

## 2022-07-18 PROBLEM — R51.9 POST-COVID CHRONIC HEADACHE: Status: RESOLVED | Noted: 2021-12-22 | Resolved: 2022-07-18

## 2022-07-18 PROBLEM — U09.9 POST-COVID CHRONIC HEADACHE: Status: RESOLVED | Noted: 2021-12-22 | Resolved: 2022-07-18

## 2022-07-18 PROCEDURE — 99213 OFFICE O/P EST LOW 20 MIN: CPT | Performed by: PHYSICIAN ASSISTANT

## 2022-07-18 RX ORDER — DICLOFENAC SODIUM 75 MG/1
75 TABLET, DELAYED RELEASE ORAL 2 TIMES DAILY
Qty: 60 TABLET | Refills: 0 | Status: SHIPPED | OUTPATIENT
Start: 2022-07-18 | End: 2022-08-14

## 2022-07-18 ASSESSMENT — FIBROSIS 4 INDEX: FIB4 SCORE: 0.47

## 2022-07-18 NOTE — PROGRESS NOTES
Subjective:   Adolfo De La Rosa is a 36 y.o. male here today for chronic right knee pain with recent exacerbation.    Chronic pain of right knee  This is a pleasant 36-year-old male who comes in today complaining of worsening right knee pain over the past few weeks.  Last week it was almost unbearable to walk.  He complains of intermittent issues where his knee locks.  Was seen initially at [facility] and Dr. Phan.  MRI which we do not have on hand was unremarkable.  He was advised to follow-up with PT.  He did not follow-up with PT and try to do exercises at home.  He is scheduled soon to see PT at [facility].  Was also advised potentially to see pain management with them if his knee pain got worse.  He is currently icing and heating the knee.  Ice is more effective than heat.  He is also taking ibuprofen and Tylenol without any improvement.       Current medicines (including changes today)  Current Outpatient Medications   Medication Sig Dispense Refill   • diclofenac DR (VOLTAREN) 75 MG Tablet Delayed Response Take 1 Tablet by mouth 2 times a day. 60 Tablet 0   • losartan (COZAAR) 100 MG Tab Take 1 Tablet by mouth every day. 90 Tablet 1   • hydrochlorothiazide (MICROZIDE) 12.5 MG capsule Take 1 Capsule by mouth every day. 90 Capsule 1   • Zinc 50 MG Cap Take 50 mg by mouth every day.     • Cholecalciferol (VITAMIN D3) 125 MCG (5000 UT) Cap Take 1 capsule by mouth every day.     • Ascorbic Acid (VITAMIN C) 1000 MG Tab Take 1 tablet by mouth every day.     • B Complex Vitamins (VITAMIN B COMPLEX PO) Take  by mouth.     • Cranberry-Milk Thistle (LIVER & KIDNEY CLEANSER PO) Take  by mouth.     • Omeprazole (PRILOSEC PO) omeprazole     • Multiple Vitamin (MULTI-VITAMIN DAILY PO) Take  by mouth.     • ibuprofen (MOTRIN) 200 MG Tab Take 200 mg by mouth every 6 hours as needed.       No current facility-administered medications for this visit.     He  has a past medical history of Abdominal pain, Apnea, sleep,  "Back pain, Back pain, Chest tightness, Chickenpox, Daytime sleepiness, Diarrhea, Fatigue, Frequent urination, Gasping for breath, GERD (gastroesophageal reflux disease), Hearing difficulty, Heartburn, Hypertension, Insomnia, Morning headache, Painful joint, Snoring, Sore muscles, Sweat, sweating, excessive, and Tonsillitis.    Social History and Family History were reviewed and updated.    ROS   No chest pain, no shortness of breath, no abdominal pain and all other systems were reviewed and are negative.       Objective:     /70 (BP Location: Right arm, Patient Position: Sitting, BP Cuff Size: Adult)   Pulse 100   Temp 35.8 °C (96.5 °F) (Temporal)   Ht 1.905 m (6' 3\")   Wt (!) 126 kg (277 lb 3.2 oz)   SpO2 97%  Body mass index is 34.65 kg/m².   Physical Exam:  Constitutional: Alert, no distress.  Skin: Warm, dry, good turgor, no rashes in visible areas.  Eye: Equal, round and reactive, conjunctiva clear, lids normal.  ENMT: Lips without lesions, good dentition, oropharynx clear.  Neck: Trachea midline, no masses.   Lymph: No cervical or supraclavicular lymphadenopathy  Respiratory: Unlabored respiratory effort, lungs appear clear, no wheezes.  Musculoskeletal: Bilateral knees appear symmetrical.  Psych: Alert and oriented x3, normal affect and mood.        Assessment and Plan:   The following treatment plan was discussed    1. Chronic pain of right knee  Chronic condition with recent exacerbation.  Follow-up with PT.  Referred to [facility].  Provided diclofenac as directed.  Discussed not taking ibuprofen with diclofenac.  They are the same medication.  We will obtain medical records and specifically the MRI from [facility].  - diclofenac DR (VOLTAREN) 75 MG Tablet Delayed Response; Take 1 Tablet by mouth 2 times a day.  Dispense: 60 Tablet; Refill: 0  - Referral to Pain Management         Followup: Return if symptoms worsen or fail to improve.    Please note that this dictation " was created using voice recognition software. I have made every reasonable attempt to correct obvious errors, but I expect that there are errors of grammar and possibly content that I did not discover before finalizing the note.

## 2022-07-18 NOTE — LETTER
[email or web address]  Satya Bullard P.A.-C.  [address]  [place] [address]  Fax: [phone]   Authorization for Release/Disclosure of   Protected Health Information   Name: ADOLFO EVERETT : 1985 SSN: [id number]   Address: [facility] [place] [facility] [place] [address] Phone:    [phone] (home)    I authorize the entity listed below to release/disclose the PHI below to:   [facility]/Satya Bullard P.A.-C. and Satya Bullard P.A.-C.   Provider or Entity Name:  [facility]   Address   City, State, Zip   Phone:      Fax:     Reason for request: continuity of care   Information to be released: X Right knee MRI results   [  ] LAST COLONOSCOPY,  including any PATH REPORT and follow-up  [  ] LAST FIT/COLOGUARD RESULT [  ] LAST DEXA  [  ] LAST MAMMOGRAM  [  ] LAST PAP  [  ] LAST LABS [  ] RETINA EXAM REPORT  [  ] IMMUNIZATION RECORDS  [ X ] Release all info      [  ] Check here and initial the line next to each item to release ALL health information INCLUDING  _____ Care and treatment for drug and / or alcohol abuse  _____ HIV testing, infection status, or AIDS  _____ Genetic Testing    DATES OF SERVICE OR TIME PERIOD TO BE DISCLOSED: _____________  I understand and acknowledge that:  * This Authorization may be revoked at any time by you in writing, except if your health information has already been used or disclosed.  * Your health information that will be used or disclosed as a result of you signing this authorization could be re-disclosed by the recipient. If this occurs, your re-disclosed health information may no longer be protected by State or Federal laws.  * You may refuse to sign this Authorization. Your refusal will not affect your ability to obtain treatment.  * This Authorization becomes effective upon signing and will  on (date) __________.      If no date is indicated, this Authorization will  one (1) year from the signature date.    Name: Adolfo Rodas  Rj    Signature:   Date:     7/18/2022       PLEASE FAX REQUESTED RECORDS BACK TO: [phone]

## 2022-07-18 NOTE — ASSESSMENT & PLAN NOTE
This is a pleasant 36-year-old male who comes in today complaining of worsening right knee pain over the past few weeks.  Last week it was almost unbearable to walk.  He complains of intermittent issues where his knee locks.  Was seen initially at [facility] and Dr. Phan.  MRI which we do not have on hand was unremarkable.  He was advised to follow-up with PT.  He did not follow-up with PT and try to do exercises at home.  He is scheduled soon to see PT at [facility].  Was also advised potentially to see pain management with them if his knee pain got worse.  He is currently icing and heating the knee.  Ice is more effective than heat.  He is also taking ibuprofen and Tylenol without any improvement.

## 2022-08-14 DIAGNOSIS — M25.561 CHRONIC PAIN OF RIGHT KNEE: ICD-10-CM

## 2022-08-14 DIAGNOSIS — G89.29 CHRONIC PAIN OF RIGHT KNEE: ICD-10-CM

## 2022-08-14 RX ORDER — DICLOFENAC SODIUM 75 MG/1
TABLET, DELAYED RELEASE ORAL
Qty: 60 TABLET | Refills: 0 | Status: SHIPPED | OUTPATIENT
Start: 2022-08-14 | End: 2022-09-12

## 2022-09-01 ENCOUNTER — HOME STUDY (OUTPATIENT)
Dept: SLEEP MEDICINE | Facility: MEDICAL CENTER | Age: 37
End: 2022-09-01
Attending: STUDENT IN AN ORGANIZED HEALTH CARE EDUCATION/TRAINING PROGRAM
Payer: COMMERCIAL

## 2022-09-01 DIAGNOSIS — G47.33 OSA (OBSTRUCTIVE SLEEP APNEA): ICD-10-CM

## 2022-09-01 PROCEDURE — 95806 SLEEP STUDY UNATT&RESP EFFT: CPT | Performed by: STUDENT IN AN ORGANIZED HEALTH CARE EDUCATION/TRAINING PROGRAM

## 2022-09-08 NOTE — PROCEDURES
DIAGNOSTIC HOME SLEEP TEST (HST) REPORT       PATIENT ID:  NAME:  Adolfo De La Rosa  MRN:               [id number]  YOB: 1985  DATE OF STUDY: 9/1/2022      Impression:     This study shows evidence of:     1. Severe obstructive sleep apnea with  Respiratory Event Index (LINDA) of 60.1 per hour and worse in supine sleep with LINDA at 62.1. These findings are based on the recording time (flow evaluation time). It is not possible with this device to determine a traditional apnea+hypopnea index (AHI) for total sleep time since EEG channels are not available.     2. Oxygenation O2 Sat. rakesh was 77% and mean O2 sat was 93% and baseline O2 at 97 %. O2 sat was below 88% for 9 min of the flow evaluation time. Oxygen Desaturation (>=3%) Index was elevated at 57.8/hr. AVG HR was 76 BPM.      TECHNICAL DESCRIPTION: [employer] apnea link air device used was a type-III home study device. Home sleep study recording included: Airflow recording by nasal pressure transducer; Respiratory Effort by 1 RIP Band; Oxygen Saturation and heart rate by finger oximetry. A position sensor and a snore channel was also used.    Scoring Criteria: A modification of the the AASM Manual for the Scoring of Sleep and Associated Events, 2012, was used.   Obstructive apnea was scored by cessation of airflow for at least 10 seconds with continuing respiratory effort.  Central apnea was scored by cessation of airflow for at least 10 seconds with no effort.  Hypopnea was scored by a 30% or more reduction in airflow for at least 10 seconds accompanied by an arterial oxygen desaturation of 3% or more.  (For Medicare patients, hypopneas were scored by a 30% or more reduction in airflow for at least 10 seconds accompanied by an arterial oxygen saturation of 4% or more, as required by their insurance, CMS.        General sleep summary: . Total recording time is 2 hours and 41 minutes and total flow evaluation time is 2 hours and 13 minutes. The  patient spent 1 hours and 38 minutes in the supine position and 0 hours and 33 minutes in the nonsupine position.    Respiratory events:    Apneas: Total 26 (Obstructive apnea index 11.7/hr, Central apnea index 0 /hr, mixed 0 /hour)  Hypopneas: Total 108 with a Hypopnea index of 48.4 /hr    Recommendations:    1. CPAP titration study vs Auto CPAP trial .   2.   In general patients with sleep apnea are advised to avoid alcohol and sedatives and to not operate a motor vehicle while drowsy. In some cases alternative treatment options may prove effective in resolving sleep apnea in these options include upper airway surgery, the use of a dental orthotic or weight loss and positional therapy. Clinical correlation is required.         Moe Mathews MD

## 2022-09-11 DIAGNOSIS — G89.29 CHRONIC PAIN OF RIGHT KNEE: ICD-10-CM

## 2022-09-11 DIAGNOSIS — M25.561 CHRONIC PAIN OF RIGHT KNEE: ICD-10-CM

## 2022-09-12 RX ORDER — DICLOFENAC SODIUM 75 MG/1
TABLET, DELAYED RELEASE ORAL
Qty: 60 TABLET | Refills: 0 | Status: SHIPPED | OUTPATIENT
Start: 2022-09-12 | End: 2022-10-11

## 2022-09-16 ENCOUNTER — OFFICE VISIT (OUTPATIENT)
Dept: SLEEP MEDICINE | Facility: MEDICAL CENTER | Age: 37
End: 2022-09-16
Payer: COMMERCIAL

## 2022-09-16 VITALS
WEIGHT: 278 LBS | BODY MASS INDEX: 34.57 KG/M2 | HEIGHT: 75 IN | OXYGEN SATURATION: 94 % | DIASTOLIC BLOOD PRESSURE: 88 MMHG | HEART RATE: 109 BPM | SYSTOLIC BLOOD PRESSURE: 140 MMHG | RESPIRATION RATE: 14 BRPM

## 2022-09-16 DIAGNOSIS — G47.33 OSA (OBSTRUCTIVE SLEEP APNEA): Primary | ICD-10-CM

## 2022-09-16 DIAGNOSIS — G47.19 EXCESSIVE DAYTIME SLEEPINESS: ICD-10-CM

## 2022-09-16 PROCEDURE — 99213 OFFICE O/P EST LOW 20 MIN: CPT | Performed by: STUDENT IN AN ORGANIZED HEALTH CARE EDUCATION/TRAINING PROGRAM

## 2022-09-16 ASSESSMENT — FIBROSIS 4 INDEX: FIB4 SCORE: 0.47

## 2022-09-16 NOTE — PROGRESS NOTES
[facility] Follow-up Visit    CC: Follow-up to discuss sleep study results      HPI:  Adolfo De La Rosa is a 36 y.o.male  with GERD, hypertension, obesity, snoring, daytime sleepiness, and severe obstructive sleep apnea.    Presents today to discuss sleep study results.  He feels he had a better night sleep on his most recent study than he did on his previous study.  He continues to be tired at times during the day.  Continues to have trouble with waking up at night and getting enough sleep.  In the past he would wake up gasping for air and has been told by by partners that he does have pauses in breathing.    Sleep History  6/24/2022 HST showed severe obstructive sleep apnea with overall LINDA 56.8, minimum oxygen saturation 73%, time at or below 80% saturation 8 minutes  9/1/2022 HST showed severe obstructive sleep apnea overall AHI 60.1, minimum oxygen saturation 77%, time at or below 88% saturation of 9 minutes    Patient Active Problem List    Diagnosis Date Noted    Witnessed apneic spells 02/18/2022    Chronic pain of right knee 12/22/2021    History of COVID-19 11/29/2021    Pure hypercholesterolemia 06/07/2021    Obesity (BMI 30-39.9) 05/21/2021    Essential hypertension 05/21/2021    Gastroesophageal reflux disease without esophagitis 05/21/2021       Past Medical History:   Diagnosis Date    Abdominal pain     Apnea, sleep     Back pain     Back pain     Chest tightness     Chickenpox     Daytime sleepiness     Diarrhea     Fatigue     Frequent urination     Gasping for breath     GERD (gastroesophageal reflux disease)     Hearing difficulty     Heartburn     Hypertension     Insomnia     Morning headache     Painful joint     Snoring     Sore muscles     Sweat, sweating, excessive     Tonsillitis         Past Surgical History:   Procedure Laterality Date    HERNIA REPAIR         Family History   Problem Relation Age of Onset    Arterial Aneurysm Mother         Abdominal    Hypertension Brother      Sleep Apnea Brother     Stroke Maternal Grandfather         Heart attack       Social History     Socioeconomic History    Marital status: Other     Spouse name: Not on file    Number of children: Not on file    Years of education: Not on file    Highest education level: Not on file   Occupational History    Not on file   Tobacco Use    Smoking status: Never    Smokeless tobacco: Never   Vaping Use    Vaping Use: Never used   Substance and Sexual Activity    Alcohol use: Yes     Alcohol/week: 16.8 oz     Types: 2 Glasses of wine, 12 Shots of liquor, 14 Standard drinks or equivalent per week     Comment: 3-10 drinks daily about 4 times weekly    Drug use: Yes     Frequency: 4.0 times per week     Types: Marijuana, Oral     Comment: Edibles    Sexual activity: Not on file     Comment: Scar (Noelle Morales)   Other Topics Concern    Not on file   Social History Narrative    Not on file     Social Determinants of Health     Financial Resource Strain: Not on file   Food Insecurity: Not on file   Transportation Needs: Not on file   Physical Activity: Not on file   Stress: Not on file   Social Connections: Not on file   Intimate Partner Violence: Not on file   Housing Stability: Not on file       Current Outpatient Medications   Medication Sig Dispense Refill    diclofenac DR (VOLTAREN) 75 MG Tablet Delayed Response TAKE 1 TABLET BY MOUTH TWICE DAILY 60 Tablet 0    losartan (COZAAR) 100 MG Tab Take 1 Tablet by mouth every day. 90 Tablet 1    hydrochlorothiazide (MICROZIDE) 12.5 MG capsule Take 1 Capsule by mouth every day. 90 Capsule 1    Zinc 50 MG Cap Take 50 mg by mouth every day.      Cholecalciferol (VITAMIN D3) 125 MCG (5000 UT) Cap Take 1 capsule by mouth every day.      Ascorbic Acid (VITAMIN C) 1000 MG Tab Take 1 tablet by mouth every day.      B Complex Vitamins (VITAMIN B COMPLEX PO) Take  by mouth.      Cranberry-Milk Thistle (LIVER & KIDNEY CLEANSER PO) Take  by mouth.      Omeprazole (PRILOSEC PO) omeprazole  "     Multiple Vitamin (MULTI-VITAMIN DAILY PO) Take  by mouth.      ibuprofen (MOTRIN) 200 MG Tab Take 200 mg by mouth every 6 hours as needed.       No current facility-administered medications for this visit.        ALLERGIES: Patient has no known allergies.    ROS  Constitutional: Denies fevers, Denies weight changes  Ears/Nose/Throat/Mouth: Denies nasal congestion or sore throat   Cardiovascular: Denies chest pain  Respiratory: Denies shortness of breath, Denies cough  Gastrointestinal/Hepatic: Denies nausea, vomiting  Sleep: see HPI      PHYSICAL EXAM  BP (!) 140/88 (BP Location: Left arm, Patient Position: Sitting, BP Cuff Size: Large adult)   Pulse (!) 109   Resp 14   Ht 1.905 m (6' 3\")   Wt (!) 126 kg (278 lb)   SpO2 94%   BMI 34.75 kg/m²   Appearance: Well-nourished, well-developed, no acute distress  Eyes:  No scleral icterus , EOMI  ENMT: masked  Musculoskeletal:  Grossly normal; gait and station normal; digits and nails normal  Skin:  No rashes, petechiae, cyanosis  Neurologic: without focal signs; oriented to person, time, place, and purpose; judgement intact      Medical Decision Making   Assessment and Plan    Adolfo De La Rosa is a 36 y.o.male  with GERD, hypertension, obesity, snoring, daytime sleepiness, and severe obstructive sleep apnea.    Obstructive sleep apnea   Reviewed recent HST with patient showing an LINDA of 16.1 and Min Oxygen saturation of 77%.  Time at or below 88% saturation 9 minutes  Based on sleep study and symptoms meets criteria for severe obstructive sleep apnea.   We also discussed possible consequences of untreated MILLY, including excessive daytime sleepiness and fatigue, cognitive dysfunction, cardiovascular complications such as elevated blood pressure, heart attacks, cardiac arrhythmias, and strokes.   Discussed that would benefit from trying CPAP at home.  Patient is open to trying CPAP.  Advised to continue to work on maintaining a healthy weight and " exercise.    RECOMMENDATIONS  -Start Auto CPAP at pressures 4-10 cm H2O  -Discussed importance of adherence/compliance   -Prescription generated for supplies   -Patient counseled to avoid driving when sleepy. Encouraged to anticipate sleepiness, consider taking a 10 min nap prior to driving, alternate with another , or pull over if sleepy while driving  -Advised to contact our office or myself with any questions via My[facility]  -Follow up in 3 months or sooner if needed      Positive airway pressure will favorably impact many of the adverse conditions and effects provoked by MILLY.    Have advised the patient to follow up with the appropriate healthcare practitioners for all other medical problems and issues.    Return for 1-2 months after starting PAP therapy.      Please note portions of this record was created using voice recognition software. I have made every reasonable attempt to correct obvious errors, but I expect that there are errors of grammar and possibly content I did not discover before finalizing the note.

## 2022-10-11 DIAGNOSIS — M25.561 CHRONIC PAIN OF RIGHT KNEE: ICD-10-CM

## 2022-10-11 DIAGNOSIS — G89.29 CHRONIC PAIN OF RIGHT KNEE: ICD-10-CM

## 2022-10-11 RX ORDER — DICLOFENAC SODIUM 75 MG/1
TABLET, DELAYED RELEASE ORAL
Qty: 60 TABLET | Refills: 0 | Status: SHIPPED | OUTPATIENT
Start: 2022-10-11 | End: 2022-11-09

## 2022-11-09 DIAGNOSIS — G89.29 CHRONIC PAIN OF RIGHT KNEE: ICD-10-CM

## 2022-11-09 DIAGNOSIS — M25.561 CHRONIC PAIN OF RIGHT KNEE: ICD-10-CM

## 2022-11-09 RX ORDER — DICLOFENAC SODIUM 75 MG/1
TABLET, DELAYED RELEASE ORAL
Qty: 60 TABLET | Refills: 0 | Status: SHIPPED | OUTPATIENT
Start: 2022-11-09 | End: 2022-12-28

## 2022-12-20 DIAGNOSIS — I10 ESSENTIAL HYPERTENSION: ICD-10-CM

## 2022-12-20 RX ORDER — HYDROCHLOROTHIAZIDE 12.5 MG/1
12.5 CAPSULE, GELATIN COATED ORAL DAILY
Qty: 90 CAPSULE | Refills: 1 | Status: SHIPPED | OUTPATIENT
Start: 2022-12-20 | End: 2023-01-20 | Stop reason: SDUPTHER

## 2022-12-28 ENCOUNTER — OFFICE VISIT (OUTPATIENT)
Dept: URGENT CARE | Facility: PHYSICIAN GROUP | Age: 37
End: 2022-12-28
Payer: COMMERCIAL

## 2022-12-28 VITALS
SYSTOLIC BLOOD PRESSURE: 136 MMHG | HEIGHT: 75 IN | RESPIRATION RATE: 16 BRPM | OXYGEN SATURATION: 96 % | BODY MASS INDEX: 34.19 KG/M2 | TEMPERATURE: 96.7 F | DIASTOLIC BLOOD PRESSURE: 82 MMHG | WEIGHT: 275 LBS | HEART RATE: 96 BPM

## 2022-12-28 DIAGNOSIS — J20.9 ACUTE BRONCHITIS DUE TO INFECTION: ICD-10-CM

## 2022-12-28 PROCEDURE — 99214 OFFICE O/P EST MOD 30 MIN: CPT | Performed by: NURSE PRACTITIONER

## 2022-12-28 RX ORDER — DOXYCYCLINE HYCLATE 100 MG
100 TABLET ORAL 2 TIMES DAILY
Qty: 14 TABLET | Refills: 0 | Status: SHIPPED | OUTPATIENT
Start: 2022-12-28 | End: 2023-01-04

## 2022-12-28 ASSESSMENT — ENCOUNTER SYMPTOMS
SPUTUM PRODUCTION: 1
ORTHOPNEA: 0
MYALGIAS: 0
NAUSEA: 0
WHEEZING: 1
CHILLS: 0
EYE DISCHARGE: 0
HEADACHES: 0
COUGH: 1
SHORTNESS OF BREATH: 0
FEVER: 0
SORE THROAT: 0
DIARRHEA: 0

## 2022-12-28 ASSESSMENT — FIBROSIS 4 INDEX: FIB4 SCORE: 0.49

## 2022-12-28 NOTE — PROGRESS NOTES
Subjective     Aodlfo De La Rosa is a 37 y.o. male who presents with Cough (Productive, burning in chest/Onset 1.5 week/Negative home test 2 days ago)            HPI  New problem.  Patient is a very pleasant 37-year-old male who presents with cough times a week and a half.  He reports that the symptoms started off as just a regular cold however the cough has progressed and has become productive with burning in his chest.  He reports dark-colored sputum when he does cough.  He denies any shortness of breath but feels like he has been wheezing especially when he lays down.  He denies fever, chills, any more nasal congestion, nausea, or diarrhea.  He has been taking over-the-counter Mucinex cold medicine for his symptoms.    Patient has no known allergies.  Current Outpatient Medications on File Prior to Visit   Medication Sig Dispense Refill    hydrochlorothiazide (MICROZIDE) 12.5 MG capsule TAKE 1 CAPSULE BY MOUTH EVERY DAY 90 Capsule 1    losartan (COZAAR) 100 MG Tab Take 1 Tablet by mouth every day. 90 Tablet 1     No current facility-administered medications on file prior to visit.     Social History     Socioeconomic History    Marital status: Other     Spouse name: Not on file    Number of children: Not on file    Years of education: Not on file    Highest education level: Not on file   Occupational History    Not on file   Tobacco Use    Smoking status: Never    Smokeless tobacco: Never   Vaping Use    Vaping Use: Never used   Substance and Sexual Activity    Alcohol use: Yes     Alcohol/week: 16.8 oz     Types: 2 Glasses of wine, 12 Shots of liquor, 14 Standard drinks or equivalent per week     Comment: 3-10 drinks daily about 4 times weekly    Drug use: Yes     Frequency: 4.0 times per week     Types: Marijuana, Oral     Comment: Edibles    Sexual activity: Not on file     Comment: Scar (Noelle Morales)   Other Topics Concern    Not on file   Social History Narrative    Not on file     Social Determinants of  "Health     Financial Resource Strain: Not on file   Food Insecurity: Not on file   Transportation Needs: Not on file   Physical Activity: Not on file   Stress: Not on file   Social Connections: Not on file   Intimate Partner Violence: Not on file   Housing Stability: Not on file     Breast Cancer-related family history is not on file.      Review of Systems   Constitutional:  Positive for malaise/fatigue. Negative for chills and fever.   HENT:  Negative for congestion and sore throat.    Eyes:  Negative for discharge.   Respiratory:  Positive for cough, sputum production and wheezing. Negative for shortness of breath.    Cardiovascular:  Negative for chest pain and orthopnea.   Gastrointestinal:  Negative for diarrhea and nausea.   Musculoskeletal:  Negative for myalgias.   Neurological:  Negative for headaches.   Endo/Heme/Allergies:  Negative for environmental allergies.            Objective     /82 (BP Location: Right arm, Patient Position: Sitting, BP Cuff Size: Adult)   Pulse 96   Temp 35.9 °C (96.7 °F) (Temporal)   Resp 16   Ht 1.905 m (6' 3\")   Wt 125 kg (275 lb)   SpO2 96%   BMI 34.37 kg/m²      Physical Exam  Vitals and nursing note reviewed.   Constitutional:       General: He is not in acute distress.     Appearance: He is well-developed.   HENT:      Head: Normocephalic.      Right Ear: Tympanic membrane and external ear normal.      Left Ear: Tympanic membrane and external ear normal.      Nose: Mucosal edema and congestion present. No rhinorrhea.      Mouth/Throat:      Pharynx: No posterior oropharyngeal erythema.   Eyes:      General:         Right eye: No discharge.         Left eye: No discharge.      Conjunctiva/sclera: Conjunctivae normal.   Cardiovascular:      Rate and Rhythm: Normal rate and regular rhythm.      Heart sounds: Normal heart sounds.   Pulmonary:      Effort: Pulmonary effort is normal. No respiratory distress.      Breath sounds: Normal breath sounds. "   Musculoskeletal:         General: Normal range of motion.      Cervical back: Normal range of motion and neck supple.   Lymphadenopathy:      Cervical: No cervical adenopathy.   Skin:     General: Skin is warm and dry.   Neurological:      Mental Status: He is alert and oriented to person, place, and time.   Psychiatric:         Behavior: Behavior normal.         Thought Content: Thought content normal.                           Assessment & Plan        1. Acute bronchitis due to infection  doxycycline (VIBRAMYCIN) 100 MG Tab        Patient will be treated with a 7-day course of doxycycline due to the nature of the sputum that he is coughing up.  He is advised that he may continue the Mucinex during the day however I would like him to trial some Delsym at nighttime so he can sleep.  Continue to push fluids.  Return precautions given.

## 2023-01-20 ENCOUNTER — OFFICE VISIT (OUTPATIENT)
Dept: MEDICAL GROUP | Facility: MEDICAL CENTER | Age: 38
End: 2023-01-20
Payer: COMMERCIAL

## 2023-01-20 VITALS
TEMPERATURE: 97.2 F | SYSTOLIC BLOOD PRESSURE: 130 MMHG | HEIGHT: 75 IN | HEART RATE: 101 BPM | OXYGEN SATURATION: 93 % | WEIGHT: 284 LBS | DIASTOLIC BLOOD PRESSURE: 80 MMHG | BODY MASS INDEX: 35.31 KG/M2

## 2023-01-20 DIAGNOSIS — I10 ESSENTIAL HYPERTENSION: ICD-10-CM

## 2023-01-20 DIAGNOSIS — Z00.00 PREVENTATIVE HEALTH CARE: ICD-10-CM

## 2023-01-20 DIAGNOSIS — Z11.59 ENCOUNTER FOR HEPATITIS C SCREENING TEST FOR LOW RISK PATIENT: ICD-10-CM

## 2023-01-20 DIAGNOSIS — K21.9 GASTROESOPHAGEAL REFLUX DISEASE WITHOUT ESOPHAGITIS: ICD-10-CM

## 2023-01-20 DIAGNOSIS — R05.1 ACUTE COUGH: ICD-10-CM

## 2023-01-20 PROBLEM — G47.33 OSA (OBSTRUCTIVE SLEEP APNEA): Status: ACTIVE | Noted: 2022-02-18

## 2023-01-20 PROCEDURE — 99214 OFFICE O/P EST MOD 30 MIN: CPT | Performed by: PHYSICIAN ASSISTANT

## 2023-01-20 RX ORDER — LOSARTAN POTASSIUM 100 MG/1
100 TABLET ORAL DAILY
Qty: 90 TABLET | Refills: 1 | Status: SHIPPED | OUTPATIENT
Start: 2023-01-20 | End: 2023-07-03

## 2023-01-20 RX ORDER — LANSOPRAZOLE 30 MG/1
30 CAPSULE, DELAYED RELEASE ORAL DAILY
COMMUNITY
End: 2024-02-26

## 2023-01-20 RX ORDER — HYDROCHLOROTHIAZIDE 12.5 MG/1
12.5 CAPSULE, GELATIN COATED ORAL DAILY
Qty: 90 CAPSULE | Refills: 1 | Status: SHIPPED | OUTPATIENT
Start: 2023-01-20

## 2023-01-20 ASSESSMENT — FIBROSIS 4 INDEX: FIB4 SCORE: 0.49

## 2023-01-20 ASSESSMENT — PATIENT HEALTH QUESTIONNAIRE - PHQ9: CLINICAL INTERPRETATION OF PHQ2 SCORE: 0

## 2023-01-20 NOTE — ASSESSMENT & PLAN NOTE
Was seen at the end of December and diagnosed with bronchitis.  Given antibiotic.  Still complains of a cough.  Cough is dry.  Did have lots of mucus production at one point.  Cough is better when he eats.  Denies any fevers, shortness of breath or chest pain.  Has never had a cough that continue well after the initial illness.  Does have reflux but his reflux symptoms are well controlled.  Takes Prevacid daily.

## 2023-01-20 NOTE — PROGRESS NOTES
Subjective:   Adolfo De La Rosa is a 37 y.o. male here today for hypertension, cough status post URI and GERD.    Essential hypertension  This is a pleasant 37-year-old male here today to follow-up on his health.  Takes both hydrochlorothiazide and losartan for history of hypertension.  Blood pressure is controlled today.  He is due for labs.    Acute cough  Was seen at the end of December and diagnosed with bronchitis.  Given antibiotic.  Still complains of a cough.  Cough is dry.  Did have lots of mucus production at one point.  Cough is better when he eats.  Denies any fevers, shortness of breath or chest pain.  Has never had a cough that continue well after the initial illness.  Does have reflux but his reflux symptoms are well controlled.  Takes Prevacid daily.       Current medicines (including changes today)  Current Outpatient Medications   Medication Sig Dispense Refill    losartan (COZAAR) 100 MG Tab Take 1 Tablet by mouth every day. 90 Tablet 1    hydrochlorothiazide (MICROZIDE) 12.5 MG capsule Take 1 Capsule by mouth every day. 90 Capsule 1    lansoprazole (PREVACID) 30 MG CAPSULE DELAYED RELEASE Take 30 mg by mouth every day.       No current facility-administered medications for this visit.     He  has a past medical history of Abdominal pain, Apnea, sleep, Back pain, Back pain, Chest tightness, Chickenpox, Daytime sleepiness, Diarrhea, Fatigue, Frequent urination, Gasping for breath, GERD (gastroesophageal reflux disease), Hearing difficulty, Heartburn, Hypertension, Insomnia, Morning headache, Painful joint, Snoring, Sore muscles, Sweat, sweating, excessive, and Tonsillitis.    Social History and Family History were reviewed and updated.    ROS   No chest pain, no shortness of breath, no abdominal pain and all other systems were reviewed and are negative.       Objective:     /80 (BP Location: Right arm, Patient Position: Sitting, BP Cuff Size: Large adult)   Pulse (!) 101   Temp 36.2 °C  "(97.2 °F) (Temporal)   Ht 1.905 m (6' 3\")   Wt (!) 129 kg (284 lb)   SpO2 93%  Body mass index is 35.5 kg/m².   Physical Exam:  Constitutional: Alert, no distress.  Skin: Warm, dry, good turgor, no rashes in visible areas.  Eye: Equal, round and reactive, conjunctiva clear, lids normal.  ENMT: Lips without lesions, good dentition, oropharynx clear.  Neck: Trachea midline, no masses.   Lymph: No cervical or supraclavicular lymphadenopathy  Respiratory: Unlabored respiratory effort, lungs clear to auscultation, no wheezes, no ronchi.  Cardiovascular: Normal S1, S2, no murmur, no edema.  Psych: Alert and oriented x3, normal affect and mood.        Assessment and Plan:   The following treatment plan was discussed    1. Essential hypertension  Chronic condition.  Stable.  Renew losartan and Microzide as directed.  Ordered labs.  - losartan (COZAAR) 100 MG Tab; Take 1 Tablet by mouth every day.  Dispense: 90 Tablet; Refill: 1  - hydrochlorothiazide (MICROZIDE) 12.5 MG capsule; Take 1 Capsule by mouth every day.  Dispense: 90 Capsule; Refill: 1    2. Acute cough  Acute, new onset condition.  Symptoms likely secondary to prior URI.  Expect symptoms to gradually improve.  Follow-up with any worsening symptoms such as fever, shortness of breath or chest pain.    3. Gastroesophageal reflux disease without esophagitis  Chronic condition.  Stable.  Continue OTC Prevacid as directed.    4. Preventative health care  Ordered labs.  Fast 8 hours.  He will be contacted with the results.  - CBC WITH DIFFERENTIAL; Future  - Comp Metabolic Panel; Future  - Lipid Profile; Future  - HEMOGLOBIN A1C; Future  - TSH WITH REFLEX TO FT4; Future    5. Encounter for hepatitis C screening test for low risk patient  Hep C viral antibody ordered.  Low risk.  - HEP C VIRUS ANTIBODY; Future         Followup: Return in about 6 months (around 7/20/2023), or if symptoms worsen or fail to improve.    Please note that this dictation was created using " voice recognition software. I have made every reasonable attempt to correct obvious errors, but I expect that there are errors of grammar and possibly content that I did not discover before finalizing the note.

## 2023-01-20 NOTE — ASSESSMENT & PLAN NOTE
This is a pleasant 37-year-old male here today to follow-up on his health.  Takes both hydrochlorothiazide and losartan for history of hypertension.  Blood pressure is controlled today.  He is due for labs.

## 2023-01-27 ENCOUNTER — APPOINTMENT (OUTPATIENT)
Dept: SLEEP MEDICINE | Facility: MEDICAL CENTER | Age: 38
End: 2023-01-27
Payer: COMMERCIAL

## 2023-02-06 ENCOUNTER — HOSPITAL ENCOUNTER (OUTPATIENT)
Dept: LAB | Facility: MEDICAL CENTER | Age: 38
End: 2023-02-06
Attending: PHYSICIAN ASSISTANT
Payer: COMMERCIAL

## 2023-02-06 DIAGNOSIS — Z11.59 ENCOUNTER FOR HEPATITIS C SCREENING TEST FOR LOW RISK PATIENT: ICD-10-CM

## 2023-02-06 DIAGNOSIS — Z00.00 PREVENTATIVE HEALTH CARE: ICD-10-CM

## 2023-02-06 LAB
ALBUMIN SERPL BCP-MCNC: 4.6 G/DL (ref 3.2–4.9)
ALBUMIN/GLOB SERPL: 1.7 G/DL
ALP SERPL-CCNC: 63 U/L (ref 30–99)
ALT SERPL-CCNC: 49 U/L (ref 2–50)
ANION GAP SERPL CALC-SCNC: 13 MMOL/L (ref 7–16)
AST SERPL-CCNC: 24 U/L (ref 12–45)
BASOPHILS # BLD AUTO: 0.8 % (ref 0–1.8)
BASOPHILS # BLD: 0.06 K/UL (ref 0–0.12)
BILIRUB SERPL-MCNC: 0.4 MG/DL (ref 0.1–1.5)
BUN SERPL-MCNC: 17 MG/DL (ref 8–22)
CALCIUM ALBUM COR SERPL-MCNC: 9.4 MG/DL (ref 8.5–10.5)
CALCIUM SERPL-MCNC: 9.9 MG/DL (ref 8.5–10.5)
CHLORIDE SERPL-SCNC: 98 MMOL/L (ref 96–112)
CHOLEST SERPL-MCNC: 256 MG/DL (ref 100–199)
CO2 SERPL-SCNC: 26 MMOL/L (ref 20–33)
CREAT SERPL-MCNC: 0.88 MG/DL (ref 0.5–1.4)
EOSINOPHIL # BLD AUTO: 0.14 K/UL (ref 0–0.51)
EOSINOPHIL NFR BLD: 1.8 % (ref 0–6.9)
ERYTHROCYTE [DISTWIDTH] IN BLOOD BY AUTOMATED COUNT: 40.4 FL (ref 35.9–50)
EST. AVERAGE GLUCOSE BLD GHB EST-MCNC: 114 MG/DL
GFR SERPLBLD CREATININE-BSD FMLA CKD-EPI: 112 ML/MIN/1.73 M 2
GLOBULIN SER CALC-MCNC: 2.7 G/DL (ref 1.9–3.5)
GLUCOSE SERPL-MCNC: 77 MG/DL (ref 65–99)
HBA1C MFR BLD: 5.6 % (ref 4–5.6)
HCT VFR BLD AUTO: 47 % (ref 42–52)
HCV AB SER QL: NORMAL
HDLC SERPL-MCNC: 48 MG/DL
HGB BLD-MCNC: 16.1 G/DL (ref 14–18)
IMM GRANULOCYTES # BLD AUTO: 0.03 K/UL (ref 0–0.11)
IMM GRANULOCYTES NFR BLD AUTO: 0.4 % (ref 0–0.9)
LDLC SERPL CALC-MCNC: 153 MG/DL
LYMPHOCYTES # BLD AUTO: 2.42 K/UL (ref 1–4.8)
LYMPHOCYTES NFR BLD: 31.3 % (ref 22–41)
MCH RBC QN AUTO: 29.9 PG (ref 27–33)
MCHC RBC AUTO-ENTMCNC: 34.3 G/DL (ref 33.7–35.3)
MCV RBC AUTO: 87.2 FL (ref 81.4–97.8)
MONOCYTES # BLD AUTO: 0.59 K/UL (ref 0–0.85)
MONOCYTES NFR BLD AUTO: 7.6 % (ref 0–13.4)
NEUTROPHILS # BLD AUTO: 4.49 K/UL (ref 1.82–7.42)
NEUTROPHILS NFR BLD: 58.1 % (ref 44–72)
NRBC # BLD AUTO: 0 K/UL
NRBC BLD-RTO: 0 /100 WBC
PLATELET # BLD AUTO: 353 K/UL (ref 164–446)
PMV BLD AUTO: 9.2 FL (ref 9–12.9)
POTASSIUM SERPL-SCNC: 4.4 MMOL/L (ref 3.6–5.5)
PROT SERPL-MCNC: 7.3 G/DL (ref 6–8.2)
RBC # BLD AUTO: 5.39 M/UL (ref 4.7–6.1)
SODIUM SERPL-SCNC: 137 MMOL/L (ref 135–145)
TRIGL SERPL-MCNC: 275 MG/DL (ref 0–149)
TSH SERPL DL<=0.005 MIU/L-ACNC: 0.69 UIU/ML (ref 0.38–5.33)
WBC # BLD AUTO: 7.7 K/UL (ref 4.8–10.8)

## 2023-02-06 PROCEDURE — 80061 LIPID PANEL: CPT

## 2023-02-06 PROCEDURE — 86803 HEPATITIS C AB TEST: CPT

## 2023-02-06 PROCEDURE — 80053 COMPREHEN METABOLIC PANEL: CPT

## 2023-02-06 PROCEDURE — 83036 HEMOGLOBIN GLYCOSYLATED A1C: CPT

## 2023-02-06 PROCEDURE — 85025 COMPLETE CBC W/AUTO DIFF WBC: CPT

## 2023-02-06 PROCEDURE — 84443 ASSAY THYROID STIM HORMONE: CPT

## 2023-02-06 PROCEDURE — 36415 COLL VENOUS BLD VENIPUNCTURE: CPT

## 2023-03-21 ENCOUNTER — OFFICE VISIT (OUTPATIENT)
Dept: URGENT CARE | Facility: PHYSICIAN GROUP | Age: 38
End: 2023-03-21
Payer: COMMERCIAL

## 2023-03-21 VITALS
RESPIRATION RATE: 16 BRPM | DIASTOLIC BLOOD PRESSURE: 98 MMHG | WEIGHT: 275 LBS | BODY MASS INDEX: 34.19 KG/M2 | HEIGHT: 75 IN | HEART RATE: 99 BPM | OXYGEN SATURATION: 96 % | SYSTOLIC BLOOD PRESSURE: 156 MMHG | TEMPERATURE: 97 F

## 2023-03-21 DIAGNOSIS — R05.2 SUBACUTE COUGH: ICD-10-CM

## 2023-03-21 PROCEDURE — 99213 OFFICE O/P EST LOW 20 MIN: CPT

## 2023-03-21 RX ORDER — METHYLPREDNISOLONE 4 MG/1
TABLET ORAL
Qty: 21 TABLET | Refills: 0 | Status: SHIPPED | OUTPATIENT
Start: 2023-03-21 | End: 2023-04-05

## 2023-03-21 RX ORDER — ALBUTEROL SULFATE 90 UG/1
2 AEROSOL, METERED RESPIRATORY (INHALATION) EVERY 6 HOURS PRN
Qty: 8.5 G | Refills: 0 | Status: SHIPPED | OUTPATIENT
Start: 2023-03-21 | End: 2023-06-22

## 2023-03-21 ASSESSMENT — ENCOUNTER SYMPTOMS
SHORTNESS OF BREATH: 0
CHILLS: 0
STRIDOR: 0
COUGH: 1
SORE THROAT: 0
WHEEZING: 0
SPUTUM PRODUCTION: 0
SINUS PAIN: 0
FEVER: 0
HEMOPTYSIS: 0

## 2023-03-21 ASSESSMENT — FIBROSIS 4 INDEX: FIB4 SCORE: 0.38

## 2023-03-21 NOTE — PROGRESS NOTES
Subjective:   Adolfo De La Rosa is a 37 y.o. male who presents for Cough (X over a week and states sometimes he cough up green/brown phlegm at times and states he tested negative for covid today)      HPI: This is a 37-year-old male who presents today for evaluation of cough.  Patient reports cough has been dry and persistent.  He reports that he has been dealing with cough over the last few months.  He was seen at urgent care in December 2022 and reevaluated for same in January 2023.  He reports cough is worse at night.  He denies heartburn or reflux.  He does have underlying history of reflux and takes lansoprazole daily for this.  He denies wheezing or shortness of breath.  No underlying history of asthma, COPD, or smoking.  Patient does report going on coughing fits where he is unable to stop coughing.  He reports taking Delsym for cough, using a [employer] fire, and drinking teas without any improvement.  He denies fevers, chills, bodyaches.  No other complaints report today.        Review of Systems   Constitutional:  Negative for chills, fever and malaise/fatigue.   HENT:  Negative for congestion, ear pain, sinus pain and sore throat.    Respiratory:  Positive for cough. Negative for hemoptysis, sputum production, shortness of breath, wheezing and stridor.      Medications:    Current Outpatient Medications on File Prior to Visit   Medication Sig Dispense Refill    losartan (COZAAR) 100 MG Tab Take 1 Tablet by mouth every day. 90 Tablet 1    hydrochlorothiazide (MICROZIDE) 12.5 MG capsule Take 1 Capsule by mouth every day. 90 Capsule 1    lansoprazole (PREVACID) 30 MG CAPSULE DELAYED RELEASE Take 30 mg by mouth every day.       No current facility-administered medications on file prior to visit.        Allergies:   Patient has no known allergies.    Problem List:   Patient Active Problem List   Diagnosis    Obesity (BMI 30-39.9)    Essential hypertension    Gastroesophageal reflux disease without esophagitis     "Pure hypercholesterolemia    History of COVID-19    Chronic pain of right knee    MILLY (obstructive sleep apnea)    Acute cough        Surgical History:  Past Surgical History:   Procedure Laterality Date    HERNIA REPAIR         Past Social Hx:   Social History     Tobacco Use    Smoking status: Never    Smokeless tobacco: Never   Vaping Use    Vaping Use: Never used   Substance Use Topics    Alcohol use: Yes     Alcohol/week: 16.8 oz     Types: 2 Glasses of wine, 12 Shots of liquor, 14 Standard drinks or equivalent per week     Comment: 3-10 drinks daily about 4 times weekly    Drug use: Yes     Frequency: 4.0 times per week     Types: Marijuana, Oral     Comment: Edibles          Problem list, medications, and allergies reviewed by myself today in Epic.     Objective:     BP (!) 156/98 (BP Location: Right arm, Patient Position: Sitting, BP Cuff Size: Large adult)   Pulse 99   Temp 36.1 °C (97 °F) (Temporal)   Resp 16   Ht 1.905 m (6' 3\")   Wt 125 kg (275 lb)   SpO2 96%   BMI 34.37 kg/m²     Physical Exam  Vitals and nursing note reviewed.   Constitutional:       General: He is not in acute distress.     Appearance: Normal appearance. He is normal weight. He is not ill-appearing, toxic-appearing or diaphoretic.   HENT:      Head: Normocephalic and atraumatic.      Mouth/Throat:      Mouth: Mucous membranes are moist.      Pharynx: Oropharynx is clear. No oropharyngeal exudate or posterior oropharyngeal erythema.   Cardiovascular:      Rate and Rhythm: Normal rate and regular rhythm.      Pulses: Normal pulses.      Heart sounds: Normal heart sounds. No murmur heard.    No friction rub. No gallop.   Pulmonary:      Effort: Pulmonary effort is normal. No respiratory distress.      Breath sounds: Normal breath sounds. No stridor. No wheezing, rhonchi or rales.      Comments: + Cough  Chest:      Chest wall: No tenderness.   Musculoskeletal:      Cervical back: Normal range of motion and neck supple. No " tenderness.   Lymphadenopathy:      Cervical: No cervical adenopathy.   Skin:     General: Skin is warm and dry.      Capillary Refill: Capillary refill takes less than 2 seconds.   Neurological:      General: No focal deficit present.      Mental Status: He is alert and oriented to person, place, and time. Mental status is at baseline.   Psychiatric:         Mood and Affect: Mood normal.         Behavior: Behavior normal.         Thought Content: Thought content normal.         Judgment: Judgment normal.       Assessment/Plan:     Diagnosis and associated orders:   1. Subacute cough  methylPREDNISolone (MEDROL DOSEPAK) 4 MG Tablet Therapy Pack    albuterol 108 (90 Base) MCG/ACT Aero Soln inhalation aerosol             Comments/MDM:   Pt is clinically stable at today's acute urgent care visit.  No acute distress noted. Appropriate for outpatient management at this time.     Patient presents today for evaluation of cough.  He is not ill or toxic appearing in clinic today.  Vital signs are stable, SPO2 96% on room air, lung sounds clear to auscultation, he is afebrile.  Patient will be trialed with Medrol Dosepak and albuterol inhaler for associated bronchospasms.  Advised patient begin taking medications as prescribed, increase oral hydration, continue use of humidifier. Patient is to return to  or go to ER for any new or worsening signs or symptoms, and follow with with PCP for recheck. Patient is agreeable with plan of care and verbalizes good understanding.             Discussed DDx, management options (risks,benefits, and alternatives to planned treatment), natural progression and supportive care.  Expressed understanding and the treatment plan was agreed upon. Questions were encouraged and answered   Return to urgent care prn if new or worsening sx or if there is no improvement in condition prn.    Educated in Red flags and indications to immediately call 911 or present to the Emergency Department.   Advised  the patient to follow-up with the primary care physician for recheck, reevaluation, and consideration of further management.    I personally reviewed prior external notes and test results pertinent to today's visit.  I have independently reviewed and interpreted all diagnostics ordered during this urgent care acute visit.       Please note that this dictation was created using voice recognition software. I have made a reasonable attempt to correct obvious errors, but I expect that there are errors of grammar and possibly content that I did not discover before finalizing the note.    This note was electronically signed by VIC Levy

## 2023-04-05 ENCOUNTER — HOSPITAL ENCOUNTER (OUTPATIENT)
Facility: MEDICAL CENTER | Age: 38
End: 2023-04-05
Attending: PHYSICIAN ASSISTANT
Payer: COMMERCIAL

## 2023-04-05 ENCOUNTER — OFFICE VISIT (OUTPATIENT)
Dept: MEDICAL GROUP | Facility: MEDICAL CENTER | Age: 38
End: 2023-04-05
Payer: COMMERCIAL

## 2023-04-05 VITALS
HEIGHT: 75 IN | HEART RATE: 98 BPM | OXYGEN SATURATION: 94 % | WEIGHT: 281.31 LBS | TEMPERATURE: 97.4 F | DIASTOLIC BLOOD PRESSURE: 70 MMHG | BODY MASS INDEX: 34.98 KG/M2 | SYSTOLIC BLOOD PRESSURE: 128 MMHG

## 2023-04-05 DIAGNOSIS — K43.9 VENTRAL HERNIA WITHOUT OBSTRUCTION OR GANGRENE: ICD-10-CM

## 2023-04-05 DIAGNOSIS — R35.0 URINARY FREQUENCY: ICD-10-CM

## 2023-04-05 PROBLEM — R05.1 ACUTE COUGH: Status: RESOLVED | Noted: 2023-01-20 | Resolved: 2023-04-05

## 2023-04-05 PROBLEM — Z86.16 HISTORY OF COVID-19: Status: RESOLVED | Noted: 2021-11-29 | Resolved: 2023-04-05

## 2023-04-05 LAB
APPEARANCE UR: CLEAR
BILIRUB UR STRIP-MCNC: NEGATIVE MG/DL
COLOR UR AUTO: NORMAL
GLUCOSE UR STRIP.AUTO-MCNC: NEGATIVE MG/DL
KETONES UR STRIP.AUTO-MCNC: NEGATIVE MG/DL
LEUKOCYTE ESTERASE UR QL STRIP.AUTO: NEGATIVE
NITRITE UR QL STRIP.AUTO: POSITIVE
PH UR STRIP.AUTO: 6 [PH] (ref 5–8)
PROT UR QL STRIP: NEGATIVE MG/DL
RBC UR QL AUTO: NEGATIVE
SP GR UR STRIP.AUTO: 1
UROBILINOGEN UR STRIP-MCNC: NORMAL MG/DL

## 2023-04-05 PROCEDURE — 81002 URINALYSIS NONAUTO W/O SCOPE: CPT | Performed by: PHYSICIAN ASSISTANT

## 2023-04-05 PROCEDURE — 87491 CHLMYD TRACH DNA AMP PROBE: CPT

## 2023-04-05 PROCEDURE — 99214 OFFICE O/P EST MOD 30 MIN: CPT | Performed by: PHYSICIAN ASSISTANT

## 2023-04-05 PROCEDURE — 87591 N.GONORRHOEAE DNA AMP PROB: CPT

## 2023-04-05 PROCEDURE — 87086 URINE CULTURE/COLONY COUNT: CPT

## 2023-04-05 RX ORDER — DOXYCYCLINE HYCLATE 100 MG
100 TABLET ORAL 2 TIMES DAILY
Qty: 14 TABLET | Refills: 0 | Status: SHIPPED | OUTPATIENT
Start: 2023-04-05 | End: 2023-04-05

## 2023-04-05 RX ORDER — CIPROFLOXACIN 500 MG/1
500 TABLET, FILM COATED ORAL 2 TIMES DAILY
Qty: 14 TABLET | Refills: 0 | Status: SHIPPED | OUTPATIENT
Start: 2023-04-05 | End: 2023-04-12

## 2023-04-05 ASSESSMENT — FIBROSIS 4 INDEX: FIB4 SCORE: 0.38

## 2023-04-05 NOTE — PROGRESS NOTES
Subjective:   Adolfo De La Rosa is a 37 y.o. male here today for urinary frequency for 2 days and possible herniation of his upper abdomen.    Urinary frequency  This is a pleasant 37-year-old male who complains of developing urinary frequency since Monday.  Approximately 2 days.  Complains of a pressure of his pelvic region.  Denies any obvious pain but does have some discomfort at times.  Denies any penile discharge.  He is monogamous.  No bleeding noted.  He did start over-the-counter Pyridium which turned his urine orange.  He is concerned of his symptoms.  Concern for UTI.  He did purchase a new tea from [employer]  Over the weekend.    Ventral hernia  Also complains of a greater than 1 year history of noted a bulge in his upper abdomen.  Denies any pain.       Current medicines (including changes today)  Current Outpatient Medications   Medication Sig Dispense Refill    doxycycline (VIBRAMYCIN) 100 MG Tab Take 1 Tablet by mouth 2 times a day for 7 days. 14 Tablet 0    albuterol 108 (90 Base) MCG/ACT Aero Soln inhalation aerosol Inhale 2 Puffs every 6 hours as needed for Shortness of Breath. 8.5 g 0    losartan (COZAAR) 100 MG Tab Take 1 Tablet by mouth every day. 90 Tablet 1    hydrochlorothiazide (MICROZIDE) 12.5 MG capsule Take 1 Capsule by mouth every day. 90 Capsule 1    lansoprazole (PREVACID) 30 MG CAPSULE DELAYED RELEASE Take 30 mg by mouth every day.       No current facility-administered medications for this visit.     He  has a past medical history of Abdominal pain, Apnea, sleep, Back pain, Back pain, Chest tightness, Chickenpox, Daytime sleepiness, Diarrhea, Fatigue, Frequent urination, Gasping for breath, GERD (gastroesophageal reflux disease), Hearing difficulty, Heartburn, Hypertension, Insomnia, Morning headache, Painful joint, Snoring, Sore muscles, Sweat, sweating, excessive, and Tonsillitis.    Social History and Family History were reviewed and updated.    ROS   No chest pain, no shortness of  "breath, no abdominal pain and all other systems were reviewed and are negative.       Objective:     /70 (BP Location: Left arm, Patient Position: Sitting, BP Cuff Size: Adult)   Pulse 98   Temp 36.3 °C (97.4 °F) (Temporal)   Ht 1.905 m (6' 3\")   Wt (!) 128 kg (281 lb 4.9 oz)   SpO2 94%  Body mass index is 35.16 kg/m².   Physical Exam:  Constitutional: Alert, no distress.  Skin: Warm, dry, good turgor, no rashes in visible areas.  Eye: Equal, round and reactive, conjunctiva clear, lids normal.  ENMT: Lips without lesions, good dentition, oropharynx clear.  Neck: Trachea midline, no masses.   Lymph: No cervical or supraclavicular lymphadenopathy  Respiratory: Unlabored respiratory effort, lungs appear clear.  Psych: Alert and oriented x3, normal affect and mood.        Assessment and Plan:   The following treatment plan was discussed    1. Urinary frequency  Acute, new onset condition.  Likely acute UTI.  Advised to stop the tea purchased at [employer].  Urinalysis positive for nitrates.  Only nitrates..  We will send off urine culture as well as test for gonorrhea chlamydia.  Start a 1 week course of ciprofloxacin.  Await urine culture.  May take up to 24 to 48 hours to process.  Contact me or follow-up at the urgent care with any worsening symptoms such as a back pain.   - POCT Urinalysis  - URINE CULTURE(NEW); Future  - Chlamydia/GC, PCR (Urine); Future  -Ciprofloxacin 500 mg 1 tablet twice a day for 7 days    2. Ventral hernia without obstruction or gangrene  New condition noted in chart but chronic.  Asymptomatic.  Ordered ultrasound hernia to look for a ventral hernia.  Contact imaging for an appointment.  - US-HERNIA ABDOMEN; Future         Followup: Return if symptoms worsen or fail to improve.    Please note that this dictation was created using voice recognition software. I have made every reasonable attempt to correct obvious errors, but I expect that there are errors of grammar and possibly content " that I did not discover before finalizing the note.

## 2023-04-05 NOTE — ASSESSMENT & PLAN NOTE
This is a pleasant 37-year-old male who complains of developing urinary frequency since Monday.  Approximately 2 days.  Complains of a pressure of his pelvic region.  Denies any obvious pain but does have some discomfort at times.  Denies any penile discharge.  He is monogamous.  No bleeding noted.  He did start over-the-counter Pyridium which turned his urine orange.  He is concerned of his symptoms.  Concern for UTI.  He did purchase a new tea from [employer]  Over the weekend.

## 2023-04-05 NOTE — ASSESSMENT & PLAN NOTE
Also complains of a greater than 1 year history of noted a bulge in his upper abdomen.  Denies any pain.

## 2023-04-06 LAB
C TRACH DNA SPEC QL NAA+PROBE: NEGATIVE
N GONORRHOEA DNA SPEC QL NAA+PROBE: NEGATIVE
SPECIMEN SOURCE: NORMAL

## 2023-04-07 LAB
BACTERIA UR CULT: NORMAL
SIGNIFICANT IND 70042: NORMAL
SITE SITE: NORMAL
SOURCE SOURCE: NORMAL

## 2023-04-12 DIAGNOSIS — R05.2 SUBACUTE COUGH: ICD-10-CM

## 2023-04-12 DIAGNOSIS — R35.0 URINARY FREQUENCY: ICD-10-CM

## 2023-04-12 RX ORDER — SULFAMETHOXAZOLE AND TRIMETHOPRIM 800; 160 MG/1; MG/1
1 TABLET ORAL 2 TIMES DAILY
Qty: 20 TABLET | Refills: 0 | Status: SHIPPED | OUTPATIENT
Start: 2023-04-12 | End: 2023-07-25

## 2023-04-21 DIAGNOSIS — R35.0 URINARY FREQUENCY: ICD-10-CM

## 2023-04-26 ENCOUNTER — HOSPITAL ENCOUNTER (OUTPATIENT)
Dept: RADIOLOGY | Facility: MEDICAL CENTER | Age: 38
End: 2023-04-26
Attending: PHYSICIAN ASSISTANT
Payer: COMMERCIAL

## 2023-04-26 DIAGNOSIS — K43.9 VENTRAL HERNIA WITHOUT OBSTRUCTION OR GANGRENE: ICD-10-CM

## 2023-04-26 PROCEDURE — 76705 ECHO EXAM OF ABDOMEN: CPT

## 2023-05-30 ENCOUNTER — HOSPITAL ENCOUNTER (OUTPATIENT)
Dept: RADIOLOGY | Facility: MEDICAL CENTER | Age: 38
End: 2023-05-30
Attending: STUDENT IN AN ORGANIZED HEALTH CARE EDUCATION/TRAINING PROGRAM
Payer: COMMERCIAL

## 2023-05-30 DIAGNOSIS — R10.9 ABDOMINAL PAIN, UNSPECIFIED ABDOMINAL LOCATION: ICD-10-CM

## 2023-05-30 PROCEDURE — 74176 CT ABD & PELVIS W/O CONTRAST: CPT

## 2023-06-22 ENCOUNTER — OFFICE VISIT (OUTPATIENT)
Dept: URGENT CARE | Facility: PHYSICIAN GROUP | Age: 38
End: 2023-06-22
Payer: COMMERCIAL

## 2023-06-22 VITALS
HEART RATE: 110 BPM | BODY MASS INDEX: 33.57 KG/M2 | SYSTOLIC BLOOD PRESSURE: 134 MMHG | DIASTOLIC BLOOD PRESSURE: 80 MMHG | OXYGEN SATURATION: 98 % | WEIGHT: 270 LBS | TEMPERATURE: 98.8 F | HEIGHT: 75 IN | RESPIRATION RATE: 16 BRPM

## 2023-06-22 DIAGNOSIS — H61.21 IMPACTED CERUMEN OF RIGHT EAR: ICD-10-CM

## 2023-06-22 PROCEDURE — 99213 OFFICE O/P EST LOW 20 MIN: CPT | Performed by: PHYSICIAN ASSISTANT

## 2023-06-22 PROCEDURE — 3079F DIAST BP 80-89 MM HG: CPT | Performed by: PHYSICIAN ASSISTANT

## 2023-06-22 PROCEDURE — 3075F SYST BP GE 130 - 139MM HG: CPT | Performed by: PHYSICIAN ASSISTANT

## 2023-06-22 ASSESSMENT — ENCOUNTER SYMPTOMS
COUGH: 0
DIZZINESS: 0
NAUSEA: 0
CARDIOVASCULAR NEGATIVE: 1
SINUS PAIN: 0
DIARRHEA: 0
VOMITING: 0
RESPIRATORY NEGATIVE: 1
SORE THROAT: 0
RHINORRHEA: 0
CONSTITUTIONAL NEGATIVE: 1
ABDOMINAL PAIN: 0
HEADACHES: 0

## 2023-06-22 ASSESSMENT — FIBROSIS 4 INDEX: FIB4 SCORE: 0.38

## 2023-06-23 NOTE — PROGRESS NOTES
Subjective     Adolfo De La Rosa is a very pleasant 37 y.o. male who presents with Otalgia (X 2 weeks. Pt states that his (R) ear feels clogged)            Otalgia   There is pain in the right ear. This is a new problem. The current episode started 1 to 4 weeks ago. The problem occurs constantly. The problem has been unchanged. There has been no fever. The fever has been present for Less than 1 day. Associated symptoms include hearing loss. Pertinent negatives include no abdominal pain, coughing, diarrhea, ear discharge, headaches, rhinorrhea, sore throat or vomiting. He has tried nothing for the symptoms. The treatment provided no relief. There is no history of a chronic ear infection or a tympanostomy tube.         PMH:  has a past medical history of Abdominal pain, Apnea, sleep, Back pain, Back pain, Chest tightness, Chickenpox, Daytime sleepiness, Diarrhea, Fatigue, Frequent urination, Gasping for breath, GERD (gastroesophageal reflux disease), Hearing difficulty, Heartburn, Hypertension, Insomnia, Morning headache, Painful joint, Snoring, Sore muscles, Sweat, sweating, excessive, and Tonsillitis.  MEDS:   Current Outpatient Medications:     sulfamethoxazole-trimethoprim (BACTRIM DS) 800-160 MG tablet, Take 1 Tablet by mouth 2 times a day., Disp: 20 Tablet, Rfl: 0    losartan (COZAAR) 100 MG Tab, Take 1 Tablet by mouth every day., Disp: 90 Tablet, Rfl: 1    hydrochlorothiazide (MICROZIDE) 12.5 MG capsule, Take 1 Capsule by mouth every day., Disp: 90 Capsule, Rfl: 1    lansoprazole (PREVACID) 30 MG CAPSULE DELAYED RELEASE, Take 30 mg by mouth every day., Disp: , Rfl:     albuterol 108 (90 Base) MCG/ACT Aero Soln inhalation aerosol, Inhale 2 Puffs every 6 hours as needed for Shortness of Breath., Disp: 8.5 g, Rfl: 0  ALLERGIES: No Known Allergies  SURGHX:   Past Surgical History:   Procedure Laterality Date    HERNIA REPAIR       SOCHX:  reports that he has never smoked. He has never used smokeless tobacco. He  "reports current alcohol use of about 16.8 oz of alcohol per week. He reports current drug use. Frequency: 4.00 times per week. Drugs: Marijuana and Oral.  FH: family history includes Arterial Aneurysm in his mother; Hypertension in his brother; Sleep Apnea in his brother; Stroke in his maternal grandfather.      Review of Systems   Constitutional: Negative.    HENT:  Positive for ear pain and hearing loss. Negative for congestion, ear discharge, rhinorrhea, sinus pain, sore throat and tinnitus.    Respiratory: Negative.  Negative for cough.    Cardiovascular: Negative.    Gastrointestinal:  Negative for abdominal pain, diarrhea, nausea and vomiting.   Neurological:  Negative for dizziness and headaches.       Medications, Allergies, and current problem list reviewed today in Epic           Objective     /80 (BP Location: Left arm, Patient Position: Sitting, BP Cuff Size: Adult long)   Pulse (!) 110   Temp 37.1 °C (98.8 °F)   Resp 16   Ht 1.905 m (6' 3\")   Wt 122 kg (270 lb)   SpO2 98%   BMI 33.75 kg/m²      Physical Exam  Vitals and nursing note reviewed.   Constitutional:       General: He is not in acute distress.     Appearance: Normal appearance. He is well-developed. He is not diaphoretic.   HENT:      Head: Normocephalic and atraumatic.      Right Ear: Hearing, tympanic membrane, ear canal and external ear normal. There is impacted cerumen.      Left Ear: Hearing, tympanic membrane, ear canal and external ear normal. There is no impacted cerumen.      Nose: Nose normal. No congestion or rhinorrhea.      Mouth/Throat:      Mouth: Mucous membranes are moist.   Eyes:      General:         Right eye: No discharge.         Left eye: No discharge.      Conjunctiva/sclera: Conjunctivae normal.   Cardiovascular:      Rate and Rhythm: Normal rate and regular rhythm.      Pulses: Normal pulses.      Heart sounds: Normal heart sounds.   Pulmonary:      Effort: Pulmonary effort is normal. No respiratory " distress.      Breath sounds: Normal breath sounds. No wheezing or rales.   Abdominal:      General: Abdomen is flat.      Palpations: Abdomen is soft.   Musculoskeletal:         General: Normal range of motion.      Cervical back: Normal range of motion and neck supple.      Right lower leg: No edema.      Left lower leg: No edema.   Lymphadenopathy:      Cervical: No cervical adenopathy.   Skin:     General: Skin is warm and dry.      Capillary Refill: Capillary refill takes less than 2 seconds.   Neurological:      General: No focal deficit present.      Mental Status: He is alert and oriented to person, place, and time.   Psychiatric:         Mood and Affect: Mood normal.                Procedure: Cerumen Removal  Risks and benefits of procedure discussed  Cerumen removed with curette and lavage after softening agent instilled by me with the help of my MA  Patient tolerated well  Post procedure exam with clear canal and normal TM           Assessment & Plan     Very pleasant male presenting with right-sided ear fullness, decreased hearing and pressure for 2 weeks.  No cough, congestion, fever or discharge.  No pertinent past ENT history.  Vitals normal.  Exam shows right-sided cerumen impaction.  Remainder of ENT exam benign.  Post procedural visualization of the TM shows no erythema bulging or signs of infection.    1. Impacted cerumen of right ear            I personally reviewed prior external notes and test results pertinent to today's visit. Return to clinic or go to ED if symptoms worsen or persist. Red flag symptoms and indications for ED discussed at length. Patient/Parent/Guardian voices understanding. Follow-up with your primary care provider in 3-5 days. All side effects and potential interactions of prescribed medication discussed including allergic response, GI upset, tendon injury, rash, sedation, OCP effectiveness, etc.    Please note that this dictation was created using voice recognition  software. I have made every reasonable attempt to correct obvious errors, but I expect that there are errors of grammar and possibly content that I did not discover before finalizing the note.

## 2023-07-03 DIAGNOSIS — I10 ESSENTIAL HYPERTENSION: ICD-10-CM

## 2023-07-03 RX ORDER — LOSARTAN POTASSIUM 100 MG/1
100 TABLET ORAL DAILY
Qty: 90 TABLET | Refills: 1 | Status: SHIPPED | OUTPATIENT
Start: 2023-07-03 | End: 2023-10-12

## 2023-07-11 RX ORDER — ALBUTEROL SULFATE 90 UG/1
AEROSOL, METERED RESPIRATORY (INHALATION)
Qty: 8.5 G | Refills: 0 | OUTPATIENT
Start: 2023-07-11

## 2023-07-17 ENCOUNTER — APPOINTMENT (OUTPATIENT)
Dept: MEDICAL GROUP | Facility: MEDICAL CENTER | Age: 38
End: 2023-07-17
Payer: COMMERCIAL

## 2023-07-25 ENCOUNTER — OFFICE VISIT (OUTPATIENT)
Dept: MEDICAL GROUP | Facility: MEDICAL CENTER | Age: 38
End: 2023-07-25
Payer: COMMERCIAL

## 2023-07-25 VITALS
TEMPERATURE: 97.2 F | DIASTOLIC BLOOD PRESSURE: 82 MMHG | BODY MASS INDEX: 33.35 KG/M2 | WEIGHT: 268.2 LBS | SYSTOLIC BLOOD PRESSURE: 136 MMHG | HEIGHT: 75 IN | OXYGEN SATURATION: 96 % | HEART RATE: 105 BPM

## 2023-07-25 DIAGNOSIS — R53.82 CHRONIC FATIGUE: ICD-10-CM

## 2023-07-25 DIAGNOSIS — F32.9 REACTIVE DEPRESSION: ICD-10-CM

## 2023-07-25 DIAGNOSIS — M79.671 BILATERAL FOOT PAIN: ICD-10-CM

## 2023-07-25 DIAGNOSIS — R10.2 CHRONIC PELVIC PAIN IN MALE: ICD-10-CM

## 2023-07-25 DIAGNOSIS — N41.0 ACUTE PROSTATITIS: ICD-10-CM

## 2023-07-25 DIAGNOSIS — G89.29 CHRONIC PELVIC PAIN IN MALE: ICD-10-CM

## 2023-07-25 DIAGNOSIS — M79.672 BILATERAL FOOT PAIN: ICD-10-CM

## 2023-07-25 PROBLEM — F32.A DEPRESSION: Status: ACTIVE | Noted: 2023-07-25

## 2023-07-25 PROBLEM — R35.0 URINARY FREQUENCY: Status: RESOLVED | Noted: 2023-04-05 | Resolved: 2023-07-25

## 2023-07-25 PROCEDURE — 3079F DIAST BP 80-89 MM HG: CPT | Performed by: PHYSICIAN ASSISTANT

## 2023-07-25 PROCEDURE — 99214 OFFICE O/P EST MOD 30 MIN: CPT | Performed by: PHYSICIAN ASSISTANT

## 2023-07-25 PROCEDURE — 3075F SYST BP GE 130 - 139MM HG: CPT | Performed by: PHYSICIAN ASSISTANT

## 2023-07-25 RX ORDER — TADALAFIL 10 MG/1
10 TABLET ORAL DAILY
Qty: 30 TABLET | Refills: 1 | Status: SHIPPED | OUTPATIENT
Start: 2023-07-25 | End: 2023-09-23

## 2023-07-25 RX ORDER — DULOXETIN HYDROCHLORIDE 30 MG/1
30 CAPSULE, DELAYED RELEASE ORAL DAILY
Qty: 30 CAPSULE | Refills: 3 | Status: SHIPPED | OUTPATIENT
Start: 2023-07-25 | End: 2023-11-22

## 2023-07-25 ASSESSMENT — FIBROSIS 4 INDEX: FIB4 SCORE: 0.38

## 2023-07-25 NOTE — PROGRESS NOTES
Subjective:   Adolfo De La Rosa is a 37 y.o. male here today for prostatitis and bilateral foot pain.    Acute prostatitis  This is a pleasant 37-year-old male comes in today and still dealing with prostatitis issues.  His symptoms have improved but still occur on a regular basis.  Feels like a screwdriver is dropped into his penis.  Work-up through urology has been unremarkable.  Medications have been unsuccessful though he is currently on Flomax 0.8 mg at nighttime.  Still getting up couple times at night.  The pain is still ongoing.  He was told at symptoms may take time to heal.  This is taking a toll on his mental health.  He has had some depression issues in the past but at this time would like to see a therapist.    Bilateral foot pain  States that a couple things make him feel better and when his walking.  He walks 20 to 30 miles a week.  Initially he was wearing sandals for a portion of the walks and developed bilateral foot pain.  Still have intermittent pain.  Currently now is wearing good running shoes.  Denies that pain is worse when he gets up in the morning.       Current medicines (including changes today)  Current Outpatient Medications   Medication Sig Dispense Refill    tadalafil (CIALIS) 10 MG tablet Take 1 Tablet by mouth every day for 60 days. 30 Tablet 1    DULoxetine (CYMBALTA) 30 MG Cap DR Particles Take 1 Capsule by mouth every day for 120 days. 30 Capsule 3    losartan (COZAAR) 100 MG Tab TAKE 1 TABLET BY MOUTH EVERY DAY 90 Tablet 1    hydrochlorothiazide (MICROZIDE) 12.5 MG capsule Take 1 Capsule by mouth every day. 90 Capsule 1    lansoprazole (PREVACID) 30 MG CAPSULE DELAYED RELEASE Take 30 mg by mouth every day.       No current facility-administered medications for this visit.     He  has a past medical history of Abdominal pain, Apnea, sleep, Back pain, Back pain, Chest tightness, Chickenpox, Daytime sleepiness, Diarrhea, Fatigue, Frequent urination, Gasping for breath, GERD  "(gastroesophageal reflux disease), Hearing difficulty, Heartburn, Hypertension, Insomnia, Morning headache, Painful joint, Snoring, Sore muscles, Sweat, sweating, excessive, and Tonsillitis.    Social History and Family History were reviewed and updated.    ROS   No chest pain, no shortness of breath, no abdominal pain and all other systems were reviewed and are negative.       Objective:     /82 (BP Location: Right arm, Patient Position: Sitting, BP Cuff Size: Adult)   Pulse (!) 105   Temp 36.2 °C (97.2 °F) (Temporal)   Ht 1.905 m (6' 3\")   Wt 122 kg (268 lb 3.2 oz)   SpO2 96%  Body mass index is 33.52 kg/m².   Physical Exam:  Constitutional: Alert, no distress.  Skin: Warm, dry, good turgor, no rashes in visible areas.  Eye: Equal, round and reactive, conjunctiva clear, lids normal.  ENMT: Lips without lesions, good dentition, oropharynx clear.  Neck: Trachea midline, no masses.   Respiratory: Unlabored respiratory effort.  Psych: Alert and oriented x3, normal affect and mood.        Assessment and Plan:   The following treatment plan was discussed    1. Acute prostatitis  Chronic condition.  Reviewed recent office notes from his urologist.  Ordered diagnostic PSA.  Will check CBC and CMP.  Advised to potentially change his patient from Flomax to Cialis.  Do not take these 2 together.  Start with 5 mg of Cialis and increase to 10 mg daily.  Also will try Cymbalta for pain.  Discussed possible side effects.  After 2 weeks if symptoms are improving but not resolved he may contact me for increased dose of Cymbalta.  - tadalafil (CIALIS) 10 MG tablet; Take 1 Tablet by mouth every day for 60 days.  Dispense: 30 Tablet; Refill: 1  - PROSTATE SPECIFIC AG DIAGNOSTIC; Future  - CBC WITH DIFFERENTIAL; Future  - Comp Metabolic Panel; Future  - DULoxetine (CYMBALTA) 30 MG Cap DR Particles; Take 1 Capsule by mouth every day for 120 days.  Dispense: 30 Capsule; Refill: 3    2. Chronic pelvic pain in male  New " condition noted in chart but chronic.  Unfortunately still dealing with prostatitis.  Please see above.  - DULoxetine (CYMBALTA) 30 MG Cap DR Particles; Take 1 Capsule by mouth every day for 120 days.  Dispense: 30 Capsule; Refill: 3    3. Reactive depression  Chronic condition.  Currently also going through a break-up with his girlfriend.  Referred to behavioral health for consultation.  - Referral to Behavioral Health    4. Bilateral foot pain  New condition noted in chart.  Symptoms appear to be more acute than chronic.  Discussed wearing inserts with his shoes while walking.  Continue to monitor symptoms.    5. Chronic fatigue  Chronic condition.  We will check testosterone profile.  Perform between 7 and 9 AM.  - Testosterone, Free & Total, Adult Male (w/SHBG); Future         Followup: Return in about 6 months (around 1/25/2024), or if symptoms worsen or fail to improve.    Please note that this dictation was created using voice recognition software. I have made every reasonable attempt to correct obvious errors, but I expect that there are errors of grammar and possibly content that I did not discover before finalizing the note.

## 2023-07-25 NOTE — ASSESSMENT & PLAN NOTE
This is a pleasant 37-year-old male comes in today and still dealing with prostatitis issues.  His symptoms have improved but still occur on a regular basis.  Feels like a screwdriver is dropped into his penis.  Work-up through urology has been unremarkable.  Medications have been unsuccessful though he is currently on Flomax 0.8 mg at nighttime.  Still getting up couple times at night.  The pain is still ongoing.  He was told at symptoms may take time to heal.  This is taking a toll on his mental health.  He has had some depression issues in the past but at this time would like to see a therapist.

## 2023-07-25 NOTE — ASSESSMENT & PLAN NOTE
States that a couple things make him feel better and when his walking.  He walks 20 to 30 miles a week.  Initially he was wearing sandals for a portion of the walks and developed bilateral foot pain.  Still have intermittent pain.  Currently now is wearing good running shoes.  Denies that pain is worse when he gets up in the morning.

## 2023-07-28 ENCOUNTER — APPOINTMENT (OUTPATIENT)
Dept: MEDICAL GROUP | Facility: MEDICAL CENTER | Age: 38
End: 2023-07-28
Payer: COMMERCIAL

## 2023-08-22 ENCOUNTER — HOSPITAL ENCOUNTER (OUTPATIENT)
Dept: LAB | Facility: MEDICAL CENTER | Age: 38
End: 2023-08-22
Attending: PHYSICIAN ASSISTANT
Payer: COMMERCIAL

## 2023-08-22 DIAGNOSIS — N41.0 ACUTE PROSTATITIS: ICD-10-CM

## 2023-08-22 DIAGNOSIS — R53.82 CHRONIC FATIGUE: ICD-10-CM

## 2023-08-22 LAB
ALBUMIN SERPL BCP-MCNC: 4.4 G/DL (ref 3.2–4.9)
ALBUMIN/GLOB SERPL: 1.8 G/DL
ALP SERPL-CCNC: 60 U/L (ref 30–99)
ALT SERPL-CCNC: 39 U/L (ref 2–50)
ANION GAP SERPL CALC-SCNC: 11 MMOL/L (ref 7–16)
AST SERPL-CCNC: 22 U/L (ref 12–45)
BASOPHILS # BLD AUTO: 0.7 % (ref 0–1.8)
BASOPHILS # BLD: 0.06 K/UL (ref 0–0.12)
BILIRUB SERPL-MCNC: 0.4 MG/DL (ref 0.1–1.5)
BUN SERPL-MCNC: 12 MG/DL (ref 8–22)
CALCIUM ALBUM COR SERPL-MCNC: 8.9 MG/DL (ref 8.5–10.5)
CALCIUM SERPL-MCNC: 9.2 MG/DL (ref 8.5–10.5)
CHLORIDE SERPL-SCNC: 104 MMOL/L (ref 96–112)
CO2 SERPL-SCNC: 24 MMOL/L (ref 20–33)
CREAT SERPL-MCNC: 1.01 MG/DL (ref 0.5–1.4)
EOSINOPHIL # BLD AUTO: 0.18 K/UL (ref 0–0.51)
EOSINOPHIL NFR BLD: 2.2 % (ref 0–6.9)
ERYTHROCYTE [DISTWIDTH] IN BLOOD BY AUTOMATED COUNT: 40.1 FL (ref 35.9–50)
FASTING STATUS PATIENT QL REPORTED: NORMAL
GFR SERPLBLD CREATININE-BSD FMLA CKD-EPI: 96 ML/MIN/1.73 M 2
GLOBULIN SER CALC-MCNC: 2.4 G/DL (ref 1.9–3.5)
GLUCOSE SERPL-MCNC: 101 MG/DL (ref 65–99)
HCT VFR BLD AUTO: 46.2 % (ref 42–52)
HGB BLD-MCNC: 15.2 G/DL (ref 14–18)
IMM GRANULOCYTES # BLD AUTO: 0.04 K/UL (ref 0–0.11)
IMM GRANULOCYTES NFR BLD AUTO: 0.5 % (ref 0–0.9)
LYMPHOCYTES # BLD AUTO: 2.92 K/UL (ref 1–4.8)
LYMPHOCYTES NFR BLD: 36.1 % (ref 22–41)
MCH RBC QN AUTO: 28.9 PG (ref 27–33)
MCHC RBC AUTO-ENTMCNC: 32.9 G/DL (ref 32.3–36.5)
MCV RBC AUTO: 87.8 FL (ref 81.4–97.8)
MONOCYTES # BLD AUTO: 0.66 K/UL (ref 0–0.85)
MONOCYTES NFR BLD AUTO: 8.2 % (ref 0–13.4)
NEUTROPHILS # BLD AUTO: 4.23 K/UL (ref 1.82–7.42)
NEUTROPHILS NFR BLD: 52.3 % (ref 44–72)
NRBC # BLD AUTO: 0 K/UL
NRBC BLD-RTO: 0 /100 WBC (ref 0–0.2)
PLATELET # BLD AUTO: 329 K/UL (ref 164–446)
PMV BLD AUTO: 9.3 FL (ref 9–12.9)
POTASSIUM SERPL-SCNC: 4.3 MMOL/L (ref 3.6–5.5)
PROT SERPL-MCNC: 6.8 G/DL (ref 6–8.2)
PSA SERPL-MCNC: 0.26 NG/ML (ref 0–4)
RBC # BLD AUTO: 5.26 M/UL (ref 4.7–6.1)
SODIUM SERPL-SCNC: 139 MMOL/L (ref 135–145)
WBC # BLD AUTO: 8.1 K/UL (ref 4.8–10.8)

## 2023-08-22 PROCEDURE — 84402 ASSAY OF FREE TESTOSTERONE: CPT

## 2023-08-22 PROCEDURE — 80053 COMPREHEN METABOLIC PANEL: CPT

## 2023-08-22 PROCEDURE — 36415 COLL VENOUS BLD VENIPUNCTURE: CPT

## 2023-08-22 PROCEDURE — 84270 ASSAY OF SEX HORMONE GLOBUL: CPT

## 2023-08-22 PROCEDURE — 85025 COMPLETE CBC W/AUTO DIFF WBC: CPT

## 2023-08-22 PROCEDURE — 84153 ASSAY OF PSA TOTAL: CPT

## 2023-08-22 PROCEDURE — 84403 ASSAY OF TOTAL TESTOSTERONE: CPT

## 2023-08-24 LAB
SHBG SERPL-SCNC: 22 NMOL/L (ref 17–56)
TESTOST FREE MFR SERPL: 2.1 % (ref 1.6–2.9)
TESTOST FREE SERPL-MCNC: 47 PG/ML (ref 47–244)
TESTOST SERPL-MCNC: 222 NG/DL (ref 300–1080)

## 2023-08-25 DIAGNOSIS — R79.89 LOW TESTOSTERONE: ICD-10-CM

## 2023-10-11 DIAGNOSIS — I10 ESSENTIAL HYPERTENSION: ICD-10-CM

## 2023-10-12 RX ORDER — LOSARTAN POTASSIUM 100 MG/1
100 TABLET ORAL DAILY
Qty: 90 TABLET | Refills: 1 | Status: SHIPPED | OUTPATIENT
Start: 2023-10-12

## 2024-01-22 ENCOUNTER — APPOINTMENT (OUTPATIENT)
Dept: MEDICAL GROUP | Facility: MEDICAL CENTER | Age: 39
End: 2024-01-22
Payer: COMMERCIAL

## 2024-02-26 ENCOUNTER — OFFICE VISIT (OUTPATIENT)
Dept: MEDICAL GROUP | Facility: MEDICAL CENTER | Age: 39
End: 2024-02-26
Payer: COMMERCIAL

## 2024-02-26 VITALS
RESPIRATION RATE: 16 BRPM | OXYGEN SATURATION: 96 % | WEIGHT: 279 LBS | TEMPERATURE: 96.8 F | SYSTOLIC BLOOD PRESSURE: 154 MMHG | BODY MASS INDEX: 34.69 KG/M2 | DIASTOLIC BLOOD PRESSURE: 102 MMHG | HEIGHT: 75 IN | HEART RATE: 114 BPM

## 2024-02-26 DIAGNOSIS — F51.01 PRIMARY INSOMNIA: ICD-10-CM

## 2024-02-26 DIAGNOSIS — Z00.00 PREVENTATIVE HEALTH CARE: ICD-10-CM

## 2024-02-26 DIAGNOSIS — R00.0 TACHYCARDIA: ICD-10-CM

## 2024-02-26 DIAGNOSIS — E66.9 OBESITY (BMI 30-39.9): ICD-10-CM

## 2024-02-26 DIAGNOSIS — R79.89 LOW TESTOSTERONE: ICD-10-CM

## 2024-02-26 DIAGNOSIS — F32.9 REACTIVE DEPRESSION: ICD-10-CM

## 2024-02-26 DIAGNOSIS — E78.00 PURE HYPERCHOLESTEROLEMIA: ICD-10-CM

## 2024-02-26 PROBLEM — N41.0 ACUTE PROSTATITIS: Status: RESOLVED | Noted: 2023-07-25 | Resolved: 2024-02-26

## 2024-02-26 PROCEDURE — 3080F DIAST BP >= 90 MM HG: CPT | Performed by: PHYSICIAN ASSISTANT

## 2024-02-26 PROCEDURE — 99214 OFFICE O/P EST MOD 30 MIN: CPT | Performed by: PHYSICIAN ASSISTANT

## 2024-02-26 PROCEDURE — 3077F SYST BP >= 140 MM HG: CPT | Performed by: PHYSICIAN ASSISTANT

## 2024-02-26 RX ORDER — OMEPRAZOLE 20 MG/1
20 CAPSULE, DELAYED RELEASE ORAL DAILY
COMMUNITY

## 2024-02-26 RX ORDER — TRAZODONE HYDROCHLORIDE 50 MG/1
50 TABLET ORAL NIGHTLY
Qty: 30 TABLET | Refills: 3 | Status: SHIPPED | OUTPATIENT
Start: 2024-02-26

## 2024-02-26 ASSESSMENT — FIBROSIS 4 INDEX: FIB4 SCORE: 0.43

## 2024-02-26 ASSESSMENT — PATIENT HEALTH QUESTIONNAIRE - PHQ9: CLINICAL INTERPRETATION OF PHQ2 SCORE: 0

## 2024-02-26 NOTE — PROGRESS NOTES
Subjective:   Adolfo De La Rosa is a 38 y.o. male here today for depression and tachycardia.    Depression  This is a pleasant 38-year-old male here today to discuss his health.  Doing better with his depression.  Still mildly depressed.  He is no longer with his fiancée.  Doing better with his prostatitis and pelvic pain concerns.  Did see urology once but was unable to follow-up with them secondary to some insurance concerns on their behalf.  Currently doing well without any symptoms.    Tachycardia  Pulses been elevated in the past.  He believes he has whitecoat hypertension.  Always gets nervous.  Takes his blood pressure at home and it is usually well-controlled.  He is taking both hydrochlorothiazide and losartan.       Current medicines (including changes today)  Current Outpatient Medications   Medication Sig Dispense Refill    omeprazole (PRILOSEC) 20 MG delayed-release capsule Take 20 mg by mouth every day.      traZODone (DESYREL) 50 MG Tab Take 1 Tablet by mouth every evening. 30 Tablet 3    losartan (COZAAR) 100 MG Tab TAKE 1 TABLET BY MOUTH EVERY DAY 90 Tablet 1    hydrochlorothiazide (MICROZIDE) 12.5 MG capsule Take 1 Capsule by mouth every day. 90 Capsule 1     No current facility-administered medications for this visit.     He  has a past medical history of Abdominal pain, Apnea, sleep, Back pain, Back pain, Chest tightness, Chickenpox, Daytime sleepiness, Diarrhea, Fatigue, Frequent urination, Gasping for breath, GERD (gastroesophageal reflux disease), Hearing difficulty, Heartburn, Hypertension, Insomnia, Morning headache, Painful joint, Snoring, Sore muscles, Sweat, sweating, excessive, and Tonsillitis.    Social History and Family History were reviewed and updated.    ROS   No chest pain, no shortness of breath, no abdominal pain and all other systems were reviewed and are negative.       Objective:     BP (!) 154/102 (BP Location: Left arm, Patient Position: Sitting, BP Cuff Size: Adult)    "Pulse (!) 114   Temp 36 °C (96.8 °F) (Temporal)   Resp 16   Ht 1.905 m (6' 3\")   Wt (!) 127 kg (279 lb)   SpO2 96%  Body mass index is 34.87 kg/m².   Physical Exam:  Constitutional: Alert, no distress.  Skin: Warm, dry, good turgor, no rashes in visible areas.  Eye: Equal, round and reactive, conjunctiva clear, lids normal.  ENMT: Lips without lesions, good dentition, oropharynx clear.  Neck: Trachea midline, no masses.   Psych: Alert and oriented x3, normal affect and mood.        Assessment and Plan:   The following treatment plan was discussed    1. Reactive depression  Chronic condition.  Stable.  Will continue to monitor.    2. Primary insomnia  Chronic condition.  New condition noted in chart.  Prescribed trazodone as directed.  Discussed side effects.  May take Up to 100 mg prior to bedtime.  - traZODone (DESYREL) 50 MG Tab; Take 1 Tablet by mouth every evening.  Dispense: 30 Tablet; Refill: 3    3. Low testosterone  Chronic condition.  Last testosterone level was 222.  Will repeat.  Likely will refer back to urology if remains low.  - Testosterone, Free & Total, Adult Male (w/SHBG); Future    4. Pure hypercholesterolemia  Chronic condition.  Status unknown.  Check cholesterol and lipoprotein a.  If lipoprotein a is elevated will start him on a statin.  - Lipid Profile; Future  - Lipoprotein (a); Future    5. Tachycardia  Chronic condition.  New condition noted in chart.  Does have whitecoat hypertension.  Advised to check pulse at home.  Will follow-up.    6. Obesity (BMI 30-39.9)  Chronic condition.  Discussed weight loss and helping with his BP.  - Patient identified as having weight management issue.  Appropriate orders and counseling given.    7. Preventative health care  Ordered labs.  Fast 8 hours.  - Lipid Profile; Future  - HEMOGLOBIN A1C; Future         Followup: Return in about 6 months (around 8/26/2024), or if symptoms worsen or fail to improve.    Please note that this dictation was created " using voice recognition software. I have made every reasonable attempt to correct obvious errors, but I expect that there are errors of grammar and possibly content that I did not discover before finalizing the note.

## 2024-02-26 NOTE — ASSESSMENT & PLAN NOTE
This is a pleasant 38-year-old male here today to discuss his health.  Doing better with his depression.  Still mildly depressed.  He is no longer with his fiancée.  Doing better with his prostatitis and pelvic pain concerns.  Did see urology once but was unable to follow-up with them secondary to some insurance concerns on their behalf.  Currently doing well without any symptoms.

## 2024-02-26 NOTE — ASSESSMENT & PLAN NOTE
Pulses been elevated in the past.  He believes he has whitecoat hypertension.  Always gets nervous.  Takes his blood pressure at home and it is usually well-controlled.  He is taking both hydrochlorothiazide and losartan.

## 2024-04-17 ENCOUNTER — HOSPITAL ENCOUNTER (OUTPATIENT)
Dept: LAB | Facility: MEDICAL CENTER | Age: 39
End: 2024-04-17
Attending: PHYSICIAN ASSISTANT
Payer: COMMERCIAL

## 2024-04-17 DIAGNOSIS — Z00.00 PREVENTATIVE HEALTH CARE: ICD-10-CM

## 2024-04-17 DIAGNOSIS — R79.89 LOW TESTOSTERONE: ICD-10-CM

## 2024-04-17 DIAGNOSIS — E78.00 PURE HYPERCHOLESTEROLEMIA: ICD-10-CM

## 2024-04-17 LAB
CHOLEST SERPL-MCNC: 247 MG/DL (ref 100–199)
EST. AVERAGE GLUCOSE BLD GHB EST-MCNC: 111 MG/DL
HBA1C MFR BLD: 5.5 % (ref 4–5.6)
HDLC SERPL-MCNC: 48 MG/DL
LDLC SERPL CALC-MCNC: 154 MG/DL
TRIGL SERPL-MCNC: 227 MG/DL (ref 0–149)

## 2024-04-17 PROCEDURE — 36415 COLL VENOUS BLD VENIPUNCTURE: CPT

## 2024-04-17 PROCEDURE — 83695 ASSAY OF LIPOPROTEIN(A): CPT

## 2024-04-17 PROCEDURE — 80061 LIPID PANEL: CPT

## 2024-04-17 PROCEDURE — 84403 ASSAY OF TOTAL TESTOSTERONE: CPT

## 2024-04-17 PROCEDURE — 84270 ASSAY OF SEX HORMONE GLOBUL: CPT

## 2024-04-17 PROCEDURE — 84402 ASSAY OF FREE TESTOSTERONE: CPT

## 2024-04-17 PROCEDURE — 83036 HEMOGLOBIN GLYCOSYLATED A1C: CPT

## 2024-04-19 PROBLEM — E78.41 ELEVATED LIPOPROTEIN A LEVEL: Status: ACTIVE | Noted: 2024-04-19

## 2024-04-19 LAB
LPA SERPL-MCNC: 37 MG/DL
SHBG SERPL-SCNC: 23 NMOL/L (ref 17–56)
TESTOST FREE MFR SERPL: 2.2 % (ref 1.6–2.9)
TESTOST FREE SERPL-MCNC: 84 PG/ML (ref 47–244)
TESTOST SERPL-MCNC: 386 NG/DL (ref 300–890)

## 2024-06-10 ENCOUNTER — OFFICE VISIT (OUTPATIENT)
Dept: URGENT CARE | Facility: PHYSICIAN GROUP | Age: 39
End: 2024-06-10
Payer: COMMERCIAL

## 2024-06-10 VITALS
SYSTOLIC BLOOD PRESSURE: 164 MMHG | BODY MASS INDEX: 34.19 KG/M2 | RESPIRATION RATE: 18 BRPM | HEART RATE: 108 BPM | TEMPERATURE: 97 F | OXYGEN SATURATION: 97 % | HEIGHT: 75 IN | DIASTOLIC BLOOD PRESSURE: 98 MMHG | WEIGHT: 275 LBS

## 2024-06-10 DIAGNOSIS — I10 ELEVATED BLOOD PRESSURE READING IN OFFICE WITH DIAGNOSIS OF HYPERTENSION: ICD-10-CM

## 2024-06-10 DIAGNOSIS — R00.0 TACHYCARDIA: ICD-10-CM

## 2024-06-10 DIAGNOSIS — R20.2 PARESTHESIA OF LEFT UPPER EXTREMITY: ICD-10-CM

## 2024-06-10 PROCEDURE — 3077F SYST BP >= 140 MM HG: CPT | Performed by: NURSE PRACTITIONER

## 2024-06-10 PROCEDURE — 99214 OFFICE O/P EST MOD 30 MIN: CPT | Mod: 25 | Performed by: NURSE PRACTITIONER

## 2024-06-10 PROCEDURE — 3080F DIAST BP >= 90 MM HG: CPT | Performed by: NURSE PRACTITIONER

## 2024-06-10 PROCEDURE — 93000 ELECTROCARDIOGRAM COMPLETE: CPT | Performed by: NURSE PRACTITIONER

## 2024-06-10 RX ORDER — METHYLPREDNISOLONE 4 MG/1
TABLET ORAL
Qty: 21 TABLET | Refills: 0 | Status: SHIPPED | OUTPATIENT
Start: 2024-06-10

## 2024-06-10 ASSESSMENT — ENCOUNTER SYMPTOMS
DIZZINESS: 1
MYALGIAS: 0
HEADACHES: 0
TINGLING: 1
CHILLS: 0
PALPITATIONS: 0
FEVER: 0
SENSORY CHANGE: 1
FOCAL WEAKNESS: 0

## 2024-06-10 ASSESSMENT — FIBROSIS 4 INDEX: FIB4 SCORE: 0.43

## 2024-06-11 NOTE — PROGRESS NOTES
Subjective     Adolfo De La Rosa is a 38 y.o. male who presents with Other (Numbness and tingly on (L) arm x 12 hours and states he was feeling really dizzy around 3 am on and off. )            HPI  New problem.  Patient is a very pleasant 38-year-old male who presents with left arm numbness and tingling since approximately 3 AM this morning.  He reports that he has had some dizziness on and off at that time.  He denies chest pain, shortness of breath, weakness in that extremity, or headache.  He has not done any treatment for this.  He has noted to have elevation in his blood pressure today but he does have a clinical diagnosis of whitecoat syndrome.    Patient has no known allergies.  Current Outpatient Medications on File Prior to Visit   Medication Sig Dispense Refill    losartan (COZAAR) 100 MG Tab TAKE 1 TABLET BY MOUTH EVERY DAY 90 Tablet 1    omeprazole (PRILOSEC) 20 MG delayed-release capsule Take 20 mg by mouth every day.      traZODone (DESYREL) 50 MG Tab Take 1 Tablet by mouth every evening. 30 Tablet 3    hydrochlorothiazide (MICROZIDE) 12.5 MG capsule Take 1 Capsule by mouth every day. 90 Capsule 1     No current facility-administered medications on file prior to visit.     Social History     Socioeconomic History    Marital status: Other     Spouse name: Not on file    Number of children: Not on file    Years of education: Not on file    Highest education level: Not on file   Occupational History    Not on file   Tobacco Use    Smoking status: Never    Smokeless tobacco: Never   Vaping Use    Vaping status: Never Used   Substance and Sexual Activity    Alcohol use: Yes     Alcohol/week: 16.8 oz     Types: 2 Glasses of wine, 12 Shots of liquor, 14 Standard drinks or equivalent per week     Comment: 3-10 drinks daily about 4 times weekly    Drug use: Yes     Frequency: 4.0 times per week     Types: Marijuana, Oral     Comment: Edibles    Sexual activity: Not on file     Comment: Single   Other Topics  "Concern    Not on file   Social History Narrative    Not on file     Social Determinants of Health     Financial Resource Strain: Not on file   Food Insecurity: Not on file   Transportation Needs: Not on file   Physical Activity: Not on file   Stress: Not on file   Social Connections: Not on file   Intimate Partner Violence: Not on file   Housing Stability: Not on file     Breast Cancer-related family history is not on file.      Review of Systems   Constitutional:  Negative for chills and fever.   Cardiovascular:  Negative for chest pain and palpitations.   Musculoskeletal:  Negative for myalgias.   Neurological:  Positive for dizziness, tingling and sensory change. Negative for focal weakness and headaches.              Objective     BP (!) 164/98 (BP Location: Right arm, Patient Position: Sitting, BP Cuff Size: Large adult)   Pulse (!) 108   Temp 36.1 °C (97 °F) (Temporal)   Resp 18   Ht 1.905 m (6' 3\")   Wt 125 kg (275 lb)   SpO2 97%   BMI 34.37 kg/m²      Physical Exam  Constitutional:       Appearance: Normal appearance. He is not ill-appearing.   Cardiovascular:      Rate and Rhythm: Regular rhythm. Tachycardia present.      Heart sounds: No murmur heard.  Pulmonary:      Effort: Pulmonary effort is normal.      Breath sounds: Normal breath sounds.   Musculoskeletal:         General: Normal range of motion.   Skin:     General: Skin is warm and dry.   Neurological:      General: No focal deficit present.      Mental Status: He is alert and oriented to person, place, and time.      Sensory: No sensory deficit.      Motor: No weakness.   Psychiatric:         Mood and Affect: Mood is anxious.                             Assessment & Plan   1. Paresthesia of left upper extremity  EKG - Clinic Performed    methylPREDNISolone (MEDROL DOSEPAK) 4 MG Tablet Therapy Pack    Referral to Pain Clinic      2. Tachycardia        3. Elevated blood pressure reading in office with diagnosis of hypertension      "     Known white coat hypertension- advised to monitor.  Tachycardia likely related to his stress level in clinic.     EKG wnl. Sinus tachycardia with rate of 101 otherwise no ectopy or ST deviation.  Trial of medrol.  Referral for possible EMG study.   No nsaids while on medrol  Differential diagnosis, natural history, supportive care, and indications for immediate follow-up were discussed.

## 2024-06-20 DIAGNOSIS — F51.01 PRIMARY INSOMNIA: ICD-10-CM

## 2024-06-20 NOTE — TELEPHONE ENCOUNTER
Received request via: Pharmacy    Was the patient seen in the last year in this department? Yes    Does the patient have an active prescription (recently filled or refills available) for medication(s) requested? No    [facility]    Does the patient have custodial Plus and need 100 day supply (blood pressure, diabetes and cholesterol meds only)? Patient does not have SCP

## 2024-06-21 RX ORDER — TRAZODONE HYDROCHLORIDE 50 MG/1
50 TABLET ORAL NIGHTLY
Qty: 90 TABLET | Refills: 1 | Status: SHIPPED | OUTPATIENT
Start: 2024-06-21

## 2024-06-25 ENCOUNTER — APPOINTMENT (OUTPATIENT)
Dept: PHYSICAL MEDICINE AND REHAB | Facility: MEDICAL CENTER | Age: 39
End: 2024-06-25
Payer: COMMERCIAL

## 2024-08-28 ENCOUNTER — APPOINTMENT (OUTPATIENT)
Dept: MEDICAL GROUP | Facility: MEDICAL CENTER | Age: 39
End: 2024-08-28
Payer: COMMERCIAL

## 2024-09-18 DIAGNOSIS — N41.0 ACUTE PROSTATITIS: ICD-10-CM

## 2024-09-19 NOTE — TELEPHONE ENCOUNTER
Received request via: Pharmacy    Was the patient seen in the last year in this department? Yes    Does the patient have an active prescription (recently filled or refills available) for medication(s) requested? No    [facility] Name: [facility]    Does the patient have group home Plus and need 100-day supply? (This applies to ALL medications) Patient does not have SCP

## 2024-09-20 RX ORDER — TADALAFIL 10 MG/1
10 TABLET ORAL
Qty: 30 TABLET | Refills: 1 | Status: SHIPPED | OUTPATIENT
Start: 2024-09-20

## 2024-10-15 DIAGNOSIS — I10 ESSENTIAL HYPERTENSION: ICD-10-CM

## 2024-10-16 RX ORDER — LOSARTAN POTASSIUM 100 MG/1
100 TABLET ORAL DAILY
Qty: 90 TABLET | Refills: 1 | Status: SHIPPED | OUTPATIENT
Start: 2024-10-16

## 2024-12-19 DIAGNOSIS — F51.01 PRIMARY INSOMNIA: ICD-10-CM

## 2024-12-19 RX ORDER — TRAZODONE HYDROCHLORIDE 50 MG/1
50 TABLET, FILM COATED ORAL NIGHTLY
Qty: 90 TABLET | Refills: 0 | Status: SHIPPED | OUTPATIENT
Start: 2024-12-19

## 2025-05-05 DIAGNOSIS — I10 ESSENTIAL HYPERTENSION: ICD-10-CM

## 2025-05-05 RX ORDER — LOSARTAN POTASSIUM 100 MG/1
100 TABLET ORAL DAILY
Qty: 90 TABLET | Refills: 0 | Status: SHIPPED | OUTPATIENT
Start: 2025-05-05 | End: 2025-05-28 | Stop reason: SDUPTHER

## 2025-05-28 ENCOUNTER — OFFICE VISIT (OUTPATIENT)
Dept: MEDICAL GROUP | Facility: MEDICAL CENTER | Age: 40
End: 2025-05-28
Payer: COMMERCIAL

## 2025-05-28 VITALS
HEART RATE: 94 BPM | BODY MASS INDEX: 36.77 KG/M2 | WEIGHT: 302 LBS | HEIGHT: 76 IN | SYSTOLIC BLOOD PRESSURE: 156 MMHG | DIASTOLIC BLOOD PRESSURE: 98 MMHG | TEMPERATURE: 98.3 F | OXYGEN SATURATION: 97 %

## 2025-05-28 DIAGNOSIS — I10 ESSENTIAL HYPERTENSION: Primary | ICD-10-CM

## 2025-05-28 DIAGNOSIS — E78.00 PURE HYPERCHOLESTEROLEMIA: ICD-10-CM

## 2025-05-28 DIAGNOSIS — F51.01 PRIMARY INSOMNIA: ICD-10-CM

## 2025-05-28 DIAGNOSIS — Z00.00 PREVENTATIVE HEALTH CARE: ICD-10-CM

## 2025-05-28 RX ORDER — DOXEPIN HYDROCHLORIDE 10 MG/1
10 CAPSULE ORAL NIGHTLY
Qty: 30 CAPSULE | Refills: 3 | Status: SHIPPED | OUTPATIENT
Start: 2025-05-28 | End: 2025-06-27

## 2025-05-28 RX ORDER — AMLODIPINE BESYLATE 5 MG/1
5 TABLET ORAL DAILY
Qty: 90 TABLET | Refills: 1 | Status: SHIPPED | OUTPATIENT
Start: 2025-05-28 | End: 2025-11-24

## 2025-05-28 RX ORDER — AMLODIPINE BESYLATE 5 MG/1
5 TABLET ORAL DAILY
Qty: 100 TABLET | Refills: 3 | Status: SHIPPED | OUTPATIENT
Start: 2025-05-28 | End: 2025-05-28 | Stop reason: SDUPTHER

## 2025-05-28 RX ORDER — LOSARTAN POTASSIUM 100 MG/1
100 TABLET ORAL DAILY
Qty: 90 TABLET | Refills: 3 | Status: SHIPPED | OUTPATIENT
Start: 2025-05-28

## 2025-05-28 ASSESSMENT — FIBROSIS 4 INDEX: FIB4 SCORE: 0.44

## 2025-05-28 ASSESSMENT — PATIENT HEALTH QUESTIONNAIRE - PHQ9: CLINICAL INTERPRETATION OF PHQ2 SCORE: 0

## 2025-05-28 NOTE — PROGRESS NOTES
"Subjective:     History of Present Illness  The patient presents for a follow-up visit.    He has been experiencing elevated blood pressure, which he attributes to a lapse in his medication regimen due to an  prescription. He reports feeling better since resuming his medication. He is currently on losartan but has discontinued hydrochlorothiazide due to increased urinary frequency. He also mentions occasional lightheadedness when not taking his medication.    He has discontinued trazodone due to the side effect of dry mouth, which he found intolerable. His sleep quality varies, with some nights being restful and others not. He is interested in exploring alternative sleep aids that do not have the same side effects as trazodone.    He continues to take omeprazole.    He has a mole on his chest that was evaluated approximately 4 to 5 years ago. He reports no significant changes in size or appearance since the last evaluation, although he feels it may have raised slightly.    He is not currently on testosterone replacement therapy. He reports no issues with prostatitis and is not taking tadalafil.      Current medicines (including changes today)  Current Medications[1]  He  has a past medical history of Abdominal pain, Apnea, sleep, Back pain, Back pain, Chest tightness, Chickenpox, Daytime sleepiness, Diarrhea, Fatigue, Frequent urination, Gasping for breath, GERD (gastroesophageal reflux disease), Hearing difficulty, Heartburn, Hypertension, Insomnia, Morning headache, Painful joint, Snoring, Sore muscles, Sweat, sweating, excessive, and Tonsillitis.    ROS   No chest pain, no shortness of breath, no abdominal pain  Positive ROS as per HPI.  All other systems reviewed and are negative.     Objective:     BP (!) 156/98 (BP Location: Right arm, Patient Position: Sitting, BP Cuff Size: Large adult)   Pulse 94   Temp 36.8 °C (98.3 °F) (Temporal)   Ht 1.93 m (6' 4\")   Wt (!) 137 kg (302 lb)   SpO2 97%  Body " mass index is 36.76 kg/m².   Physical Exam  Skin: Benign mole on chest, no changes noted.  Constitutional: Alert, no distress.  Skin: Warm, dry, good turgor, no rashes in visible areas.  Eye: Equal, round and reactive, conjunctiva clear, lids normal.  ENMT: Lips without lesions, good dentition, oropharynx clear.  Neck: Trachea midline, no masses, no thyromegaly. No cervical or supraclavicular lymphadenopathy  Respiratory: Unlabored respiratory effort, lungs clear to auscultation, no wheezes, no ronchi.  Cardiovascular: Normal S1, S2, no murmur, no edema.  Abdomen: Soft, non-tender, no masses, no hepatosplenomegaly.  Psych: Alert and oriented x3, normal affect and mood.      Results  Labs   - Lipoprotein a: Mildly elevated   - Cholesterol: High   - Testosterone: Decent        Assessment and Plan:   The following treatment plan was discussed    Assessment & Plan  1. Hypertension.  - Blood pressure remains elevated at 156/90s despite adherence to losartan.  - Amlodipine 5 mg has been prescribed to be taken in conjunction with losartan. Potential side effects, including swelling of the hands or feet, were discussed.  - A CT cardiac score test has been ordered to assess for the presence of plaque in the heart, given his age and elevated lipoprotein a levels. He has been advised to abstain from coffee and antihistamines for 4 hours prior to the test.  - A comprehensive lab workup, including complete blood count, liver and kidney function tests, cholesterol panel, A1c, and thyroid function tests, has been ordered. He has been instructed to fast for 8 hours prior to the lab work.    2. Insomnia.  - He has discontinued trazodone due to severe dry mouth.  - Doxepin 10 mg has been prescribed for sleep, with instructions to take 10-20 mg at night.  - The potential side effects of doxepin, including grogginess, were discussed.  - The patient expressed interest in trying a new medication for sleep that does not cause dry  mouth.    3. Chest mole.  - The mole appears benign and has not changed significantly over the past 4-5 years.  - The patient declined a referral to dermatology for further evaluation.  - The mole was previously checked and deemed non-serious.  - No further action required at this time.    4. Medication management.  - Continues to take omeprazole.  - Losartan prescription has been updated and sent to the pharmacy.  - The patient reported feeling lightheaded when off losartan but improved upon resuming the medication.  - No current use of testosterone replacement therapy; testosterone levels were within normal limits during the last assessment.    Follow-up  The patient is scheduled for a follow-up visit in 11/2025.      ORDERS:  1. Essential hypertension (Primary)    - losartan (COZAAR) 100 MG Tab; Take 1 Tablet by mouth every day.  Dispense: 90 Tablet; Refill: 3  - amLODIPine (NORVASC) 5 MG Tab; Take 1 Tablet by mouth every day for 180 days.  Dispense: 90 Tablet; Refill: 1    2. Primary insomnia    - doxepin (SINEQUAN) 10 MG Cap; Take 1 Capsule by mouth every evening for 30 days.  Dispense: 30 Capsule; Refill: 3    3. Pure hypercholesterolemia    - CT-CARDIAC SCORING; Future    4. Preventative health care    - CBC WITHOUT DIFFERENTIAL; Future  - Comp Metabolic Panel; Future  - HEMOGLOBIN A1C; Future  - Lipid Profile; Future  - TSH WITH REFLEX TO FT4; Future    () Today's E/M visit is associated with medical care services that serve as the continuing focal point for all needed health care services and/or with medical care services that are part of ongoing care related to a patient's single, serious condition or a complex condition: This includes furnishing services to patients on an ongoing basis that result in care that is personalized to the patient. The services result in a comprehensive, longitudinal, and continuous relationship with the patient and involve delivery of team-based care that is accessible,  coordinated with other practitioners and providers, and integrated with the broader health care landscape.        Please note that this dictation was created using voice recognition software. I have made every reasonable attempt to correct obvious errors, but I expect that there are errors of grammar and possibly content that I did not discover before finalizing the note.      Attestation      Verbal consent was acquired by the patient to use [employer] ambient listening note generation during this visit Yes                  [1]   Current Outpatient Medications   Medication Sig Dispense Refill    doxepin (SINEQUAN) 10 MG Cap Take 1 Capsule by mouth every evening for 30 days. 30 Capsule 3    losartan (COZAAR) 100 MG Tab Take 1 Tablet by mouth every day. 90 Tablet 3    amLODIPine (NORVASC) 5 MG Tab Take 1 Tablet by mouth every day for 180 days. 90 Tablet 1    omeprazole (PRILOSEC) 20 MG delayed-release capsule Take 20 mg by mouth every day.       No current facility-administered medications for this visit.

## 2025-06-03 ENCOUNTER — HOSPITAL ENCOUNTER (OUTPATIENT)
Dept: RADIOLOGY | Facility: MEDICAL CENTER | Age: 40
End: 2025-06-03
Attending: PHYSICIAN ASSISTANT
Payer: COMMERCIAL

## 2025-06-03 DIAGNOSIS — E78.00 PURE HYPERCHOLESTEROLEMIA: ICD-10-CM

## 2025-06-03 PROCEDURE — 4410556 CT-CARDIAC SCORING (SELF PAY ONLY)

## 2025-06-04 ENCOUNTER — RESULTS FOLLOW-UP (OUTPATIENT)
Dept: MEDICAL GROUP | Facility: MEDICAL CENTER | Age: 40
End: 2025-06-04

## 2025-06-04 PROBLEM — R93.1 AGATSTON CORONARY ARTERY CALCIUM SCORE LESS THAN 100: Status: ACTIVE | Noted: 2025-06-04

## 2025-07-25 ENCOUNTER — APPOINTMENT (OUTPATIENT)
Dept: LAB | Facility: MEDICAL CENTER | Age: 40
End: 2025-07-25
Payer: COMMERCIAL

## 2025-08-14 ENCOUNTER — APPOINTMENT (OUTPATIENT)
Dept: LAB | Facility: MEDICAL CENTER | Age: 40
End: 2025-08-14
Payer: COMMERCIAL